# Patient Record
Sex: FEMALE | Race: ASIAN | NOT HISPANIC OR LATINO | URBAN - METROPOLITAN AREA
[De-identification: names, ages, dates, MRNs, and addresses within clinical notes are randomized per-mention and may not be internally consistent; named-entity substitution may affect disease eponyms.]

---

## 2018-06-12 ENCOUNTER — EMERGENCY (EMERGENCY)
Facility: HOSPITAL | Age: 48
LOS: 1 days | Discharge: HOME | End: 2018-06-12
Admitting: EMERGENCY MEDICINE
Payer: COMMERCIAL

## 2018-06-12 VITALS
HEART RATE: 70 BPM | TEMPERATURE: 98 F | SYSTOLIC BLOOD PRESSURE: 172 MMHG | DIASTOLIC BLOOD PRESSURE: 98 MMHG | RESPIRATION RATE: 18 BRPM | OXYGEN SATURATION: 98 %

## 2018-06-12 PROCEDURE — 73610 X-RAY EXAM OF ANKLE: CPT | Mod: 26,LT

## 2018-06-12 PROCEDURE — 29515 APPLICATION SHORT LEG SPLINT: CPT | Mod: LT

## 2018-06-12 PROCEDURE — 99283 EMERGENCY DEPT VISIT LOW MDM: CPT | Mod: 25

## 2018-06-12 RX ORDER — IBUPROFEN 200 MG
600 TABLET ORAL ONCE
Qty: 0 | Refills: 0 | Status: COMPLETED | OUTPATIENT
Start: 2018-06-12 | End: 2018-06-12

## 2018-06-12 RX ADMIN — Medication 600 MILLIGRAM(S): at 12:46

## 2018-06-12 NOTE — ED PROVIDER NOTE - MUSCULOSKELETAL MINIMAL EXAM
L lateral ankle with flexion. No malleolar tenderness, no achilles tenderness.  No swelling, + 2 pedal pulse.  ROM intact.

## 2018-06-12 NOTE — ED PROVIDER NOTE - MEDICAL DECISION MAKING DETAILS
47 y/o F presents to ED c/o L ankle pain s/p rolling ankle. 47 y/o F presents to ED c/o L ankle pain s/p rolling ankle.  Wet read xray negative.  Pt placed in stirrup brace.  Weight bearing as tolerated.  Likely ankle sprain.  Pt referred to ortho for f/u for persistent pain.
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
## 2018-06-16 DIAGNOSIS — Z91.018 ALLERGY TO OTHER FOODS: ICD-10-CM
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
## 2018-06-16 DIAGNOSIS — S93.402A SPRAIN OF UNSPECIFIED LIGAMENT OF LEFT ANKLE, INITIAL ENCOUNTER: ICD-10-CM

## 2018-06-16 DIAGNOSIS — M25.572 PAIN IN LEFT ANKLE AND JOINTS OF LEFT FOOT: ICD-10-CM
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
## 2018-06-16 DIAGNOSIS — I10 ESSENTIAL (PRIMARY) HYPERTENSION: ICD-10-CM

## 2019-04-17 ENCOUNTER — OUTPATIENT (OUTPATIENT)
Dept: OUTPATIENT SERVICES | Facility: HOSPITAL | Age: 49
LOS: 1 days | End: 2019-04-17
Payer: COMMERCIAL

## 2019-04-17 PROBLEM — I10 ESSENTIAL (PRIMARY) HYPERTENSION: Chronic | Status: ACTIVE | Noted: 2018-06-12

## 2019-04-18 PROCEDURE — 78014 THYROID IMAGING W/BLOOD FLOW: CPT | Mod: 26

## 2019-04-18 PROCEDURE — A9516: CPT

## 2019-04-18 PROCEDURE — 78014 THYROID IMAGING W/BLOOD FLOW: CPT

## 2022-01-21 VITALS
HEIGHT: 64 IN | RESPIRATION RATE: 16 BRPM | WEIGHT: 151.02 LBS | HEART RATE: 75 BPM | SYSTOLIC BLOOD PRESSURE: 125 MMHG | OXYGEN SATURATION: 98 % | DIASTOLIC BLOOD PRESSURE: 82 MMHG | TEMPERATURE: 98 F

## 2022-01-21 NOTE — ASU PATIENT PROFILE, ADULT - FALL HARM RISK - UNIVERSAL INTERVENTIONS
Bed in lowest position, wheels locked, appropriate side rails in place/Call bell, personal items and telephone in reach/Instruct patient to call for assistance before getting out of bed or chair/Non-slip footwear when patient is out of bed/[place] to call system/Physically safe environment - no spills, clutter or unnecessary equipment/Purposeful Proactive Rounding/Room/bathroom lighting operational, light cord in reach

## 2022-01-24 ENCOUNTER — OUTPATIENT (OUTPATIENT)
Dept: INPATIENT UNIT | Facility: HOSPITAL | Age: 52
LOS: 1 days | End: 2022-01-24
Payer: COMMERCIAL

## 2022-01-24 VITALS
SYSTOLIC BLOOD PRESSURE: 120 MMHG | HEART RATE: 81 BPM | OXYGEN SATURATION: 96 % | RESPIRATION RATE: 17 BRPM | DIASTOLIC BLOOD PRESSURE: 68 MMHG

## 2022-01-24 LAB
BLD GP AB SCN SERPL QL: NEGATIVE — SIGNIFICANT CHANGE UP
RH IG SCN BLD-IMP: POSITIVE — SIGNIFICANT CHANGE UP

## 2022-01-24 PROCEDURE — 88112 CYTOPATH CELL ENHANCE TECH: CPT

## 2022-01-24 PROCEDURE — 88305 TISSUE EXAM BY PATHOLOGIST: CPT | Mod: 26

## 2022-01-24 PROCEDURE — 88112 CYTOPATH CELL ENHANCE TECH: CPT | Mod: 26

## 2022-01-24 PROCEDURE — 86850 RBC ANTIBODY SCREEN: CPT

## 2022-01-24 PROCEDURE — 88307 TISSUE EXAM BY PATHOLOGIST: CPT | Mod: 26

## 2022-01-24 PROCEDURE — 58661 LAPAROSCOPY REMOVE ADNEXA: CPT

## 2022-01-24 PROCEDURE — 88305 TISSUE EXAM BY PATHOLOGIST: CPT

## 2022-01-24 PROCEDURE — 88307 TISSUE EXAM BY PATHOLOGIST: CPT

## 2022-01-24 PROCEDURE — C1889: CPT

## 2022-01-24 PROCEDURE — 86901 BLOOD TYPING SEROLOGIC RH(D): CPT

## 2022-01-24 PROCEDURE — 86900 BLOOD TYPING SEROLOGIC ABO: CPT

## 2022-01-24 DEVICE — SURGIFLO HEMOSTATIC MATRIX KIT
Type: IMPLANTABLE DEVICE | Status: NON-FUNCTIONAL
Removed: 2022-01-24

## 2022-01-24 RX ORDER — SODIUM CHLORIDE 9 MG/ML
1000 INJECTION, SOLUTION INTRAVENOUS
Refills: 0 | Status: DISCONTINUED | OUTPATIENT
Start: 2022-01-24 | End: 2022-01-24

## 2022-01-24 RX ORDER — AMLODIPINE BESYLATE 2.5 MG/1
1 TABLET ORAL
Qty: 0 | Refills: 0 | DISCHARGE

## 2022-01-24 RX ORDER — LOSARTAN POTASSIUM 100 MG/1
1 TABLET, FILM COATED ORAL
Qty: 0 | Refills: 0 | DISCHARGE

## 2022-01-24 RX ORDER — HYDROMORPHONE HYDROCHLORIDE 2 MG/ML
0.5 INJECTION INTRAMUSCULAR; INTRAVENOUS; SUBCUTANEOUS ONCE
Refills: 0 | Status: DISCONTINUED | OUTPATIENT
Start: 2022-01-24 | End: 2022-01-24

## 2022-01-24 RX ORDER — LEVOTHYROXINE SODIUM 125 MCG
0 TABLET ORAL
Qty: 0 | Refills: 0 | DISCHARGE

## 2022-01-24 RX ORDER — LEVOTHYROXINE SODIUM 125 MCG
1 TABLET ORAL
Qty: 0 | Refills: 0 | DISCHARGE

## 2022-01-24 RX ORDER — ONDANSETRON 8 MG/1
8 TABLET, FILM COATED ORAL ONCE
Refills: 0 | Status: DISCONTINUED | OUTPATIENT
Start: 2022-01-24 | End: 2022-01-24

## 2022-01-24 RX ADMIN — SODIUM CHLORIDE 125 MILLILITER(S): 9 INJECTION, SOLUTION INTRAVENOUS at 15:04

## 2022-01-24 NOTE — BRIEF OPERATIVE NOTE - OPERATION/FINDINGS
Exam under anesthesia with normal anteverted uterus, no pelvic masses. On laparoscopic entry right sided hydrosalpinx noted with adhesions to ovary and to right pelvic sidewall. Similarly, adhesions from left tube and ovary to left pelvic sidewall. Pelvic washings taken. L/s BSO performed with lysis of adhesions, specimens placed in endocatch bag and removed through LLQ 10mm trocar, sent to pathology. LLQ port closed under direct visualization. Skin closed with 3-0 monocryl.

## 2022-01-24 NOTE — BRIEF OPERATIVE NOTE - NSICDXBRIEFPROCEDURE_GEN_ALL_CORE_FT
PROCEDURES:  Laparoscopic bilateral salpingo-oophorectomy 24-Jan-2022 13:53:10  Comfort Dorsey  Exam under anesthesia, pelvic 24-Jan-2022 13:53:20  Comfort Dorsey

## 2022-01-24 NOTE — ASU DISCHARGE PLAN (ADULT/PEDIATRIC) - NS MD DC FALL RISK RISK
For information on Fall & Injury Prevention, visit: [email or web address] OR  [email or web address] OR  https://www.cdc.gov/steadi/patient.html

## 2022-01-26 LAB — NON-GYNECOLOGICAL CYTOLOGY STUDY: SIGNIFICANT CHANGE UP

## 2022-01-28 LAB — SURGICAL PATHOLOGY STUDY: SIGNIFICANT CHANGE UP

## 2023-01-07 ENCOUNTER — INPATIENT (INPATIENT)
Facility: HOSPITAL | Age: 53
LOS: 1 days | Discharge: HOME | DRG: 419 | End: 2023-01-09
Attending: SURGERY | Admitting: SURGERY
Payer: COMMERCIAL

## 2023-01-07 VITALS
HEART RATE: 61 BPM | OXYGEN SATURATION: 99 % | RESPIRATION RATE: 16 BRPM | SYSTOLIC BLOOD PRESSURE: 113 MMHG | TEMPERATURE: 98 F | DIASTOLIC BLOOD PRESSURE: 78 MMHG

## 2023-01-07 DIAGNOSIS — Z90.722 ACQUIRED ABSENCE OF OVARIES, BILATERAL: Chronic | ICD-10-CM

## 2023-01-07 PROBLEM — E03.9 HYPOTHYROIDISM, UNSPECIFIED: Chronic | Status: ACTIVE | Noted: 2022-01-21

## 2023-01-07 PROBLEM — Z15.01 GENETIC SUSCEPTIBILITY TO MALIGNANT NEOPLASM OF BREAST: Chronic | Status: ACTIVE | Noted: 2022-01-21

## 2023-01-07 PROBLEM — R06.83 SNORING: Chronic | Status: ACTIVE | Noted: 2022-01-21

## 2023-01-07 LAB
ALBUMIN SERPL ELPH-MCNC: 4.5 G/DL — SIGNIFICANT CHANGE UP (ref 3.4–5)
ALBUMIN SERPL ELPH-MCNC: 4.6 G/DL — SIGNIFICANT CHANGE UP (ref 3.3–5)
ALP SERPL-CCNC: 90 U/L — SIGNIFICANT CHANGE UP (ref 40–120)
ALP SERPL-CCNC: 99 U/L — SIGNIFICANT CHANGE UP (ref 40–120)
ALT FLD-CCNC: 38 U/L — SIGNIFICANT CHANGE UP (ref 10–45)
ALT FLD-CCNC: 59 U/L — HIGH (ref 12–42)
ANION GAP SERPL CALC-SCNC: 10 MMOL/L — SIGNIFICANT CHANGE UP (ref 5–17)
ANION GAP SERPL CALC-SCNC: 11 MMOL/L — SIGNIFICANT CHANGE UP (ref 9–16)
APPEARANCE UR: CLEAR — SIGNIFICANT CHANGE UP
AST SERPL-CCNC: 16 U/L — SIGNIFICANT CHANGE UP (ref 10–40)
AST SERPL-CCNC: 20 U/L — SIGNIFICANT CHANGE UP (ref 15–37)
BACTERIA # UR AUTO: PRESENT /HPF
BASOPHILS # BLD AUTO: 0.02 K/UL — SIGNIFICANT CHANGE UP (ref 0–0.2)
BASOPHILS NFR BLD AUTO: 0.4 % — SIGNIFICANT CHANGE UP (ref 0–2)
BILIRUB SERPL-MCNC: 0.3 MG/DL — SIGNIFICANT CHANGE UP (ref 0.2–1.2)
BILIRUB SERPL-MCNC: 0.7 MG/DL — SIGNIFICANT CHANGE UP (ref 0.2–1.2)
BILIRUB UR-MCNC: NEGATIVE — SIGNIFICANT CHANGE UP
BLD GP AB SCN SERPL QL: NEGATIVE — SIGNIFICANT CHANGE UP
BUN SERPL-MCNC: 14 MG/DL — SIGNIFICANT CHANGE UP (ref 7–23)
BUN SERPL-MCNC: 21 MG/DL — SIGNIFICANT CHANGE UP (ref 7–23)
CALCIUM SERPL-MCNC: 9.2 MG/DL — SIGNIFICANT CHANGE UP (ref 8.4–10.5)
CALCIUM SERPL-MCNC: 9.4 MG/DL — SIGNIFICANT CHANGE UP (ref 8.5–10.5)
CHLORIDE SERPL-SCNC: 104 MMOL/L — SIGNIFICANT CHANGE UP (ref 96–108)
CHLORIDE SERPL-SCNC: 104 MMOL/L — SIGNIFICANT CHANGE UP (ref 96–108)
CO2 SERPL-SCNC: 28 MMOL/L — SIGNIFICANT CHANGE UP (ref 22–31)
CO2 SERPL-SCNC: 29 MMOL/L — SIGNIFICANT CHANGE UP (ref 22–31)
COLOR SPEC: YELLOW — SIGNIFICANT CHANGE UP
CREAT SERPL-MCNC: 0.68 MG/DL — SIGNIFICANT CHANGE UP (ref 0.5–1.3)
CREAT SERPL-MCNC: 0.9 MG/DL — SIGNIFICANT CHANGE UP (ref 0.5–1.3)
DIFF PNL FLD: ABNORMAL
EGFR: 104 ML/MIN/1.73M2 — SIGNIFICANT CHANGE UP
EGFR: 76 ML/MIN/1.73M2 — SIGNIFICANT CHANGE UP
EOSINOPHIL # BLD AUTO: 0.06 K/UL — SIGNIFICANT CHANGE UP (ref 0–0.5)
EOSINOPHIL NFR BLD AUTO: 1.1 % — SIGNIFICANT CHANGE UP (ref 0–6)
EPI CELLS # UR: SIGNIFICANT CHANGE UP /HPF (ref 0–5)
FLUAV AG NPH QL: SIGNIFICANT CHANGE UP
FLUBV AG NPH QL: SIGNIFICANT CHANGE UP
GLUCOSE SERPL-MCNC: 157 MG/DL — HIGH (ref 70–99)
GLUCOSE SERPL-MCNC: 96 MG/DL — SIGNIFICANT CHANGE UP (ref 70–99)
GLUCOSE UR QL: NEGATIVE — SIGNIFICANT CHANGE UP
HCG SERPL-ACNC: 1 MIU/ML — SIGNIFICANT CHANGE UP
HCG SERPL-ACNC: 1 MIU/ML — SIGNIFICANT CHANGE UP
HCT VFR BLD CALC: 43.1 % — SIGNIFICANT CHANGE UP (ref 34.5–45)
HCT VFR BLD CALC: 44.8 % — SIGNIFICANT CHANGE UP (ref 34.5–45)
HGB BLD-MCNC: 14.4 G/DL — SIGNIFICANT CHANGE UP (ref 11.5–15.5)
HGB BLD-MCNC: 14.8 G/DL — SIGNIFICANT CHANGE UP (ref 11.5–15.5)
IMM GRANULOCYTES NFR BLD AUTO: 0.2 % — SIGNIFICANT CHANGE UP (ref 0–0.9)
KETONES UR-MCNC: NEGATIVE — SIGNIFICANT CHANGE UP
LEUKOCYTE ESTERASE UR-ACNC: NEGATIVE — SIGNIFICANT CHANGE UP
LIDOCAIN IGE QN: 149 U/L — SIGNIFICANT CHANGE UP (ref 73–393)
LYMPHOCYTES # BLD AUTO: 1 K/UL — SIGNIFICANT CHANGE UP (ref 1–3.3)
LYMPHOCYTES # BLD AUTO: 17.7 % — SIGNIFICANT CHANGE UP (ref 13–44)
MAGNESIUM SERPL-MCNC: 2 MG/DL — SIGNIFICANT CHANGE UP (ref 1.6–2.6)
MCHC RBC-ENTMCNC: 29.1 PG — SIGNIFICANT CHANGE UP (ref 27–34)
MCHC RBC-ENTMCNC: 29.1 PG — SIGNIFICANT CHANGE UP (ref 27–34)
MCHC RBC-ENTMCNC: 33 GM/DL — SIGNIFICANT CHANGE UP (ref 32–36)
MCHC RBC-ENTMCNC: 33.4 GM/DL — SIGNIFICANT CHANGE UP (ref 32–36)
MCV RBC AUTO: 87.2 FL — SIGNIFICANT CHANGE UP (ref 80–100)
MCV RBC AUTO: 88.2 FL — SIGNIFICANT CHANGE UP (ref 80–100)
MONOCYTES # BLD AUTO: 0.3 K/UL — SIGNIFICANT CHANGE UP (ref 0–0.9)
MONOCYTES NFR BLD AUTO: 5.3 % — SIGNIFICANT CHANGE UP (ref 2–14)
NEUTROPHILS # BLD AUTO: 4.26 K/UL — SIGNIFICANT CHANGE UP (ref 1.8–7.4)
NEUTROPHILS NFR BLD AUTO: 75.3 % — SIGNIFICANT CHANGE UP (ref 43–77)
NITRITE UR-MCNC: NEGATIVE — SIGNIFICANT CHANGE UP
NRBC # BLD: 0 /100 WBCS — SIGNIFICANT CHANGE UP (ref 0–0)
NRBC # BLD: 0 /100 WBCS — SIGNIFICANT CHANGE UP (ref 0–0)
PH UR: 7 — SIGNIFICANT CHANGE UP (ref 5–8)
PHOSPHATE SERPL-MCNC: 3.7 MG/DL — SIGNIFICANT CHANGE UP (ref 2.5–4.5)
PLATELET # BLD AUTO: 243 K/UL — SIGNIFICANT CHANGE UP (ref 150–400)
PLATELET # BLD AUTO: 262 K/UL — SIGNIFICANT CHANGE UP (ref 150–400)
POTASSIUM SERPL-MCNC: 3.1 MMOL/L — LOW (ref 3.5–5.3)
POTASSIUM SERPL-MCNC: 3.3 MMOL/L — LOW (ref 3.5–5.3)
POTASSIUM SERPL-SCNC: 3.1 MMOL/L — LOW (ref 3.5–5.3)
POTASSIUM SERPL-SCNC: 3.3 MMOL/L — LOW (ref 3.5–5.3)
PROT SERPL-MCNC: 7.3 G/DL — SIGNIFICANT CHANGE UP (ref 6–8.3)
PROT SERPL-MCNC: 8.2 G/DL — SIGNIFICANT CHANGE UP (ref 6.4–8.2)
PROT UR-MCNC: NEGATIVE MG/DL — SIGNIFICANT CHANGE UP
RBC # BLD: 4.94 M/UL — SIGNIFICANT CHANGE UP (ref 3.8–5.2)
RBC # BLD: 5.08 M/UL — SIGNIFICANT CHANGE UP (ref 3.8–5.2)
RBC # FLD: 12.4 % — SIGNIFICANT CHANGE UP (ref 10.3–14.5)
RBC # FLD: 12.5 % — SIGNIFICANT CHANGE UP (ref 10.3–14.5)
RBC CASTS # UR COMP ASSIST: < 5 /HPF — SIGNIFICANT CHANGE UP
RH IG SCN BLD-IMP: POSITIVE — SIGNIFICANT CHANGE UP
RSV RNA NPH QL NAA+NON-PROBE: SIGNIFICANT CHANGE UP
SARS-COV-2 RNA SPEC QL NAA+PROBE: SIGNIFICANT CHANGE UP
SODIUM SERPL-SCNC: 142 MMOL/L — SIGNIFICANT CHANGE UP (ref 135–145)
SODIUM SERPL-SCNC: 144 MMOL/L — SIGNIFICANT CHANGE UP (ref 132–145)
SP GR SPEC: 1.02 — SIGNIFICANT CHANGE UP (ref 1–1.03)
UROBILINOGEN FLD QL: 0.2 E.U./DL — SIGNIFICANT CHANGE UP
WBC # BLD: 5.65 K/UL — SIGNIFICANT CHANGE UP (ref 3.8–10.5)
WBC # BLD: 7.53 K/UL — SIGNIFICANT CHANGE UP (ref 3.8–10.5)
WBC # FLD AUTO: 5.65 K/UL — SIGNIFICANT CHANGE UP (ref 3.8–10.5)
WBC # FLD AUTO: 7.53 K/UL — SIGNIFICANT CHANGE UP (ref 3.8–10.5)
WBC UR QL: < 5 /HPF — SIGNIFICANT CHANGE UP

## 2023-01-07 PROCEDURE — 76705 ECHO EXAM OF ABDOMEN: CPT | Mod: 26

## 2023-01-07 PROCEDURE — 74177 CT ABD & PELVIS W/CONTRAST: CPT | Mod: 26

## 2023-01-07 PROCEDURE — 99053 MED SERV 10PM-8AM 24 HR FAC: CPT

## 2023-01-07 PROCEDURE — 99285 EMERGENCY DEPT VISIT HI MDM: CPT

## 2023-01-07 RX ORDER — LEVOTHYROXINE SODIUM 125 MCG
60 TABLET ORAL AT BEDTIME
Refills: 0 | Status: DISCONTINUED | OUTPATIENT
Start: 2023-01-07 | End: 2023-01-08

## 2023-01-07 RX ORDER — SODIUM CHLORIDE 9 MG/ML
1000 INJECTION, SOLUTION INTRAVENOUS
Refills: 0 | Status: DISCONTINUED | OUTPATIENT
Start: 2023-01-07 | End: 2023-01-08

## 2023-01-07 RX ORDER — FAMOTIDINE 10 MG/ML
20 INJECTION INTRAVENOUS ONCE
Refills: 0 | Status: COMPLETED | OUTPATIENT
Start: 2023-01-07 | End: 2023-01-07

## 2023-01-07 RX ORDER — KETOROLAC TROMETHAMINE 30 MG/ML
15 SYRINGE (ML) INJECTION ONCE
Refills: 0 | Status: DISCONTINUED | OUTPATIENT
Start: 2023-01-07 | End: 2023-01-07

## 2023-01-07 RX ORDER — METRONIDAZOLE 500 MG
500 TABLET ORAL ONCE
Refills: 0 | Status: COMPLETED | OUTPATIENT
Start: 2023-01-07 | End: 2023-01-07

## 2023-01-07 RX ORDER — POTASSIUM CHLORIDE 20 MEQ
10 PACKET (EA) ORAL
Refills: 0 | Status: COMPLETED | OUTPATIENT
Start: 2023-01-07 | End: 2023-01-08

## 2023-01-07 RX ORDER — SODIUM CHLORIDE 9 MG/ML
1000 INJECTION INTRAMUSCULAR; INTRAVENOUS; SUBCUTANEOUS ONCE
Refills: 0 | Status: COMPLETED | OUTPATIENT
Start: 2023-01-07 | End: 2023-01-07

## 2023-01-07 RX ORDER — CEFTRIAXONE 500 MG/1
1000 INJECTION, POWDER, FOR SOLUTION INTRAMUSCULAR; INTRAVENOUS ONCE
Refills: 0 | Status: COMPLETED | OUTPATIENT
Start: 2023-01-07 | End: 2023-01-07

## 2023-01-07 RX ORDER — ONDANSETRON 8 MG/1
4 TABLET, FILM COATED ORAL ONCE
Refills: 0 | Status: COMPLETED | OUTPATIENT
Start: 2023-01-07 | End: 2023-01-07

## 2023-01-07 RX ORDER — HEPARIN SODIUM 5000 [USP'U]/ML
5000 INJECTION INTRAVENOUS; SUBCUTANEOUS EVERY 8 HOURS
Refills: 0 | Status: DISCONTINUED | OUTPATIENT
Start: 2023-01-07 | End: 2023-01-09

## 2023-01-07 RX ORDER — AMLODIPINE BESYLATE 2.5 MG/1
5 TABLET ORAL EVERY 24 HOURS
Refills: 0 | Status: DISCONTINUED | OUTPATIENT
Start: 2023-01-07 | End: 2023-01-09

## 2023-01-07 RX ADMIN — Medication 15 MILLIGRAM(S): at 07:31

## 2023-01-07 RX ADMIN — Medication 100 MILLIGRAM(S): at 16:40

## 2023-01-07 RX ADMIN — FAMOTIDINE 20 MILLIGRAM(S): 10 INJECTION INTRAVENOUS at 07:31

## 2023-01-07 RX ADMIN — AMLODIPINE BESYLATE 5 MILLIGRAM(S): 2.5 TABLET ORAL at 23:51

## 2023-01-07 RX ADMIN — SODIUM CHLORIDE 1000 MILLILITER(S): 9 INJECTION INTRAMUSCULAR; INTRAVENOUS; SUBCUTANEOUS at 07:31

## 2023-01-07 RX ADMIN — Medication 100 MILLIEQUIVALENT(S): at 23:51

## 2023-01-07 RX ADMIN — Medication 15 MILLIGRAM(S): at 17:08

## 2023-01-07 RX ADMIN — SODIUM CHLORIDE 1000 MILLILITER(S): 9 INJECTION INTRAMUSCULAR; INTRAVENOUS; SUBCUTANEOUS at 17:08

## 2023-01-07 RX ADMIN — CEFTRIAXONE 1000 MILLIGRAM(S): 500 INJECTION, POWDER, FOR SOLUTION INTRAMUSCULAR; INTRAVENOUS at 17:08

## 2023-01-07 RX ADMIN — CEFTRIAXONE 100 MILLIGRAM(S): 500 INJECTION, POWDER, FOR SOLUTION INTRAMUSCULAR; INTRAVENOUS at 16:09

## 2023-01-07 RX ADMIN — ONDANSETRON 4 MILLIGRAM(S): 8 TABLET, FILM COATED ORAL at 07:31

## 2023-01-07 RX ADMIN — HEPARIN SODIUM 5000 UNIT(S): 5000 INJECTION INTRAVENOUS; SUBCUTANEOUS at 22:23

## 2023-01-07 RX ADMIN — SODIUM CHLORIDE 105 MILLILITER(S): 9 INJECTION, SOLUTION INTRAVENOUS at 22:21

## 2023-01-07 NOTE — H&P ADULT - ASSESSMENT
52 BRCA positive with PMHX of HLD, HTN, pre-diabetes, hypothyroidism and PSHx of risk-reducing BSO (2022, Dr. Foster at [facility]) presents with 1-day history of upper back pain associated with nausea and 1 episode of NBNB emesis. Afebrile, non-tachycardic, normotensive. On exam, abdomen is soft, non-distended, mild RUQ ttp with negative Reyes's sign.WBC 5.65, H/H 14.8/44.8, chemistry unremarkable, LFTs wnl. CTAP shows stone at gallbladder neck with mild distension of the gallbladder without wall thickening or PCF, CBD 0.8cm, no intrahepatic biliary dilatation. RUQ US consistent with acute calculus cholecystitis.     Admit to general surgery team 4, regional, Dr. May  NPO/IVF  Pain and nausea control PRN  Home meds as appropriate  HSQ/SCDs/OOBA/IS  AM labs  Add on to OR for laparoscopic cholecystectomy with IOC 52 BRCA positive with PMHX of HLD, HTN, pre-diabetes, hypothyroidism and PSHx of risk-reducing BSO (2022, Dr. Foster at [facility]) presents with 1-day history of upper back pain associated with nausea and 1 episode of NBNB emesis. Afebrile, non-tachycardic, normotensive. On exam, abdomen is soft, non-distended, mild RUQ ttp with negative Reyes's sign.WBC 5.65, H/H 14.8/44.8, chemistry unremarkable, LFTs wnl. CTAP shows stone at gallbladder neck with mild distension of the gallbladder without wall thickening or PCF, CBD 0.8cm, no intrahepatic biliary dilatation. Symptomatic cholelithiasis.    Admit to general surgery team 4, regional, Dr. May  NPO/IVF  Pain and nausea control PRN  Home meds as appropriate  HSQ/SCDs/OOBA/IS  AM labs  Add on to OR for laparoscopic cholecystectomy with IOC    CHIEF RESIDENT ADDENDUM  Agree with above. 52F PMHx BRCA + s/p BSO 2022 a/w epigastric pain, nausea and vomiting. VSS, abdomen soft, nontender, nondistended, negative Reyes's sign. WBC 6. T bili and LFTs normal. CT and US with equivocal acute cholecystitis with gallbladder wall thickening, pericholecystic fluid, negative sonographic reyes's sign but a 2.7cm impacted stone in the neck with CBD 0.7cm. 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 Less likely acute cholecystitis given no current RUQ pain or tenderness, no WBC. Plan for lap francia with IOC tomorrow. Discussed with attending surgeon.

## 2023-01-07 NOTE — ED ADULT NURSE REASSESSMENT NOTE - NS ED NURSE REASSESS COMMENT FT1
Pt received from Andrey LINDA. Pt resting comfortably in bed. No s/s of acute distress. Will continue to monitor

## 2023-01-07 NOTE — PATIENT PROFILE ADULT - FALL HARM RISK - UNIVERSAL INTERVENTIONS
Bed in lowest position, wheels locked, appropriate side rails in place/Call bell, personal items and telephone in reach/Instruct patient to call for assistance before getting out of bed or chair/Non-slip footwear when patient is out of bed/[place] to call system/Physically safe environment - no spills, clutter or unnecessary equipment/Purposeful Proactive Rounding/Room/bathroom lighting operational, light cord in reach

## 2023-01-07 NOTE — ED PROVIDER NOTE - OBJECTIVE STATEMENT
52-year-old female with hx HTN hypothyroid on synthroid, BRCA-2 positive, incidental gallstones found on MRI (done for cancer screening), comes to ED with 1 day of gradual onset epigastric and right upper quadrant pain radiating to the back, 1-2 episodes of vomiting today, denies fever/chills, no trauma.

## 2023-01-07 NOTE — ED PROVIDER NOTE - PROGRESS NOTE DETAILS
Sign out from Dr. Hurd: pt w hx of gallstones w RUQ pain, nausea, vomiting, found to have RUQ TTP.  Pending CT a/p and US RUQ.    CT a/p shows stone in neck of gallbladder.  US consistent with acute calculus cholecystitis.  On my eval, pt reports persistent RUQ pain which had improved transiently earlier today but is now worsening.  +TTP in RUQ.  Neg murphys.  Will give ceftriaxone/ flagyl, admit to surgery at .

## 2023-01-07 NOTE — ED ADULT NURSE NOTE - CHIEF COMPLAINT QUOTE
Pt walk into ED c/o mid back pain, nausea, increased bleching and bloating x2 hours. Pt denies injuries, abdominal pain, or urinary changes. Pt reports normal bowel movements. Pt endorses receiving routine MRI x2 months ago and being told she had a gallstone. Pt reports PMH of HTN, and hypothyroidism. Pt states "I'm flying to [place] tomorrow so I wanted to get it checked out before I left".

## 2023-01-07 NOTE — ED ADULT NURSE NOTE - OBJECTIVE STATEMENT
Pt presents to ed reporting abd pain, back pain x 2 hrs PTA, associated with nausea. denies fever, denies dysuria  hist of gallstones

## 2023-01-07 NOTE — ED PROVIDER NOTE - CLINICAL SUMMARY MEDICAL DECISION MAKING FREE TEXT BOX
Right upper quadrant pain with history of gallstones, rule out cholecystitis.  Will check labs, ultrasound and CT.  Reevaluate.  Signed out to a.m. team pending imaging and final dispo.

## 2023-01-07 NOTE — ED ADULT TRIAGE NOTE - CHIEF COMPLAINT QUOTE
Pt walk into ED c/o mid back pain, nausea, increased bleching and bloating x2 hours. Pt denies injuries, abdominal pain, or urinary changes. Pt reports normal bowel movements. Pt endorses receiving routine MRI x2 months ago and being told she had a gallstone. Pt reports PMH of HTN, and hypothyroidism. Pt states "I'm flying to [place] tomorrow so I wanted to get it checked out before I left".

## 2023-01-07 NOTE — ED PROVIDER NOTE - PHYSICAL EXAMINATION
VSS in NAD non toxic appearing  NCAT EOMI PERRL OP clear  heart RRR no murmur   lungs CTA no wheezing no rales no rhonchi   abd soft (+) RUQ tenderness (+) mild epigastric tenderness ND no CVAT no guarding no rebound   normal neuro exam CN I-XII grossly intact, no groos motor or sensory deficits  no peripheral c/c/e

## 2023-01-07 NOTE — H&P ADULT - HISTORY OF PRESENT ILLNESS
52 BRCA positive with PMHX of HLD, HTN, pre-diabetes, hypothyroidism and PSHx of risk-reducing BSO (2022, Dr. Foster at [facility]) presents with 1-day history of upper back pain associated with nausea and 1 episode of NBNB emesis. She states the pain woke her up from sleep last night and was progressively worsening with increasing nausea. She denies fevers/chill, constipation/diarrhea, hematochezia/melena. Passing flatus, last BM yesterday AM. She has biannual screening MRIs which she reports have shown GB stones.     Family history of breast cancer in mother, denies history of colon cancer, Crohn's disease, ulcerative colitis.    Last colonoscopy ___, last EGD ___.    On arrival, she is afebrile, non-tachycardic, normotensive. Labs show WBC 5.65, H/H 14.8/44.8, chemistry unremarkable, LFTs wnl. CTAP shows stone at gallbladder neck with mild distension of the gallbladder without wall thickening or PCF, CBD 0.8cm, no intrahepatic biliary dilatation. RUQ US consistent with acute calculus cholecystitis.      52 BRCA positive with PMHX of HLD, HTN, pre-diabetes, hypothyroidism and PSHx of risk-reducing BSO (2022, Dr. Foster at [facility]) presents with 1-day history of upper back pain associated with nausea and 1 episode of NBNB emesis. She states the pain woke her up from sleep last night and was progressively worsening with increasing nausea. She denies fevers/chill, constipation/diarrhea, hematochezia/melena. Passing flatus, last BM yesterday AM. She has biannual screening MRIs which she reports have shown GB stones.     Family history of breast cancer in mother, denies history of colon cancer, Crohn's disease, ulcerative colitis.    Last colonoscopy in 2020 reportedly normal, no history of EGD.    On arrival, she is afebrile, non-tachycardic, normotensive. Labs show WBC 5.65, H/H 14.8/44.8, chemistry unremarkable, LFTs wnl. CTAP shows stone at gallbladder neck with mild distension of the gallbladder without wall thickening or PCF, CBD 0.8cm, no intrahepatic biliary dilatation. RUQ US consistent with acute calculus cholecystitis.

## 2023-01-08 LAB
A1C WITH ESTIMATED AVERAGE GLUCOSE RESULT: 5.8 % — HIGH (ref 4–5.6)
ALBUMIN SERPL ELPH-MCNC: 4 G/DL — SIGNIFICANT CHANGE UP (ref 3.3–5)
ALP SERPL-CCNC: 80 U/L — SIGNIFICANT CHANGE UP (ref 40–120)
ALT FLD-CCNC: 31 U/L — SIGNIFICANT CHANGE UP (ref 10–45)
ANION GAP SERPL CALC-SCNC: 11 MMOL/L — SIGNIFICANT CHANGE UP (ref 5–17)
AST SERPL-CCNC: 16 U/L — SIGNIFICANT CHANGE UP (ref 10–40)
BILIRUB SERPL-MCNC: 0.7 MG/DL — SIGNIFICANT CHANGE UP (ref 0.2–1.2)
BLD GP AB SCN SERPL QL: NEGATIVE — SIGNIFICANT CHANGE UP
BUN SERPL-MCNC: 14 MG/DL — SIGNIFICANT CHANGE UP (ref 7–23)
CALCIUM SERPL-MCNC: 8.7 MG/DL — SIGNIFICANT CHANGE UP (ref 8.4–10.5)
CHLORIDE SERPL-SCNC: 105 MMOL/L — SIGNIFICANT CHANGE UP (ref 96–108)
CO2 SERPL-SCNC: 27 MMOL/L — SIGNIFICANT CHANGE UP (ref 22–31)
CREAT SERPL-MCNC: 0.75 MG/DL — SIGNIFICANT CHANGE UP (ref 0.5–1.3)
CULTURE RESULTS: SIGNIFICANT CHANGE UP
EGFR: 95 ML/MIN/1.73M2 — SIGNIFICANT CHANGE UP
ESTIMATED AVERAGE GLUCOSE: 120 MG/DL — HIGH (ref 68–114)
GLUCOSE SERPL-MCNC: 101 MG/DL — HIGH (ref 70–99)
HCT VFR BLD CALC: 40.3 % — SIGNIFICANT CHANGE UP (ref 34.5–45)
HGB BLD-MCNC: 13.1 G/DL — SIGNIFICANT CHANGE UP (ref 11.5–15.5)
MAGNESIUM SERPL-MCNC: 2.1 MG/DL — SIGNIFICANT CHANGE UP (ref 1.6–2.6)
MCHC RBC-ENTMCNC: 29 PG — SIGNIFICANT CHANGE UP (ref 27–34)
MCHC RBC-ENTMCNC: 32.5 GM/DL — SIGNIFICANT CHANGE UP (ref 32–36)
MCV RBC AUTO: 89.2 FL — SIGNIFICANT CHANGE UP (ref 80–100)
NRBC # BLD: 0 /100 WBCS — SIGNIFICANT CHANGE UP (ref 0–0)
PHOSPHATE SERPL-MCNC: 3.8 MG/DL — SIGNIFICANT CHANGE UP (ref 2.5–4.5)
PLATELET # BLD AUTO: 228 K/UL — SIGNIFICANT CHANGE UP (ref 150–400)
POTASSIUM SERPL-MCNC: 3.8 MMOL/L — SIGNIFICANT CHANGE UP (ref 3.5–5.3)
POTASSIUM SERPL-SCNC: 3.8 MMOL/L — SIGNIFICANT CHANGE UP (ref 3.5–5.3)
PROT SERPL-MCNC: 6.3 G/DL — SIGNIFICANT CHANGE UP (ref 6–8.3)
RBC # BLD: 4.52 M/UL — SIGNIFICANT CHANGE UP (ref 3.8–5.2)
RBC # FLD: 12.6 % — SIGNIFICANT CHANGE UP (ref 10.3–14.5)
RH IG SCN BLD-IMP: POSITIVE — SIGNIFICANT CHANGE UP
SODIUM SERPL-SCNC: 143 MMOL/L — SIGNIFICANT CHANGE UP (ref 135–145)
SPECIMEN SOURCE: SIGNIFICANT CHANGE UP
WBC # BLD: 5.12 K/UL — SIGNIFICANT CHANGE UP (ref 3.8–10.5)
WBC # FLD AUTO: 5.12 K/UL — SIGNIFICANT CHANGE UP (ref 3.8–10.5)

## 2023-01-08 PROCEDURE — 99253 IP/OBS CNSLTJ NEW/EST LOW 45: CPT

## 2023-01-08 PROCEDURE — 88304 TISSUE EXAM BY PATHOLOGIST: CPT | Mod: 26

## 2023-01-08 DEVICE — LIGATING CLIPS WECK HEMOLOK POLYMER MEDIUM-LARGE (GREEN) 6: Type: IMPLANTABLE DEVICE | Status: FUNCTIONAL

## 2023-01-08 RX ORDER — LEVOTHYROXINE SODIUM 125 MCG
75 TABLET ORAL
Refills: 0 | Status: DISCONTINUED | OUTPATIENT
Start: 2023-01-08 | End: 2023-01-09

## 2023-01-08 RX ORDER — HYDROMORPHONE HYDROCHLORIDE 2 MG/ML
0.5 INJECTION INTRAMUSCULAR; INTRAVENOUS; SUBCUTANEOUS ONCE
Refills: 0 | Status: DISCONTINUED | OUTPATIENT
Start: 2023-01-08 | End: 2023-01-09

## 2023-01-08 RX ORDER — METRONIDAZOLE 500 MG
500 TABLET ORAL EVERY 8 HOURS
Refills: 0 | Status: DISCONTINUED | OUTPATIENT
Start: 2023-01-08 | End: 2023-01-09

## 2023-01-08 RX ORDER — SODIUM CHLORIDE 9 MG/ML
1000 INJECTION, SOLUTION INTRAVENOUS
Refills: 0 | Status: DISCONTINUED | OUTPATIENT
Start: 2023-01-08 | End: 2023-01-09

## 2023-01-08 RX ORDER — HYDROMORPHONE HYDROCHLORIDE 2 MG/ML
0.25 INJECTION INTRAMUSCULAR; INTRAVENOUS; SUBCUTANEOUS ONCE
Refills: 0 | Status: DISCONTINUED | OUTPATIENT
Start: 2023-01-08 | End: 2023-01-08

## 2023-01-08 RX ORDER — CEFTRIAXONE 500 MG/1
2000 INJECTION, POWDER, FOR SOLUTION INTRAMUSCULAR; INTRAVENOUS ONCE
Refills: 0 | Status: COMPLETED | OUTPATIENT
Start: 2023-01-08 | End: 2023-01-08

## 2023-01-08 RX ORDER — POTASSIUM CHLORIDE 20 MEQ
10 PACKET (EA) ORAL
Refills: 0 | Status: COMPLETED | OUTPATIENT
Start: 2023-01-08 | End: 2023-01-08

## 2023-01-08 RX ORDER — OXYCODONE HYDROCHLORIDE 5 MG/1
5 TABLET ORAL ONCE
Refills: 0 | Status: DISCONTINUED | OUTPATIENT
Start: 2023-01-08 | End: 2023-01-09

## 2023-01-08 RX ORDER — ONDANSETRON 8 MG/1
4 TABLET, FILM COATED ORAL ONCE
Refills: 0 | Status: DISCONTINUED | OUTPATIENT
Start: 2023-01-08 | End: 2023-01-09

## 2023-01-08 RX ADMIN — HEPARIN SODIUM 5000 UNIT(S): 5000 INJECTION INTRAVENOUS; SUBCUTANEOUS at 05:36

## 2023-01-08 RX ADMIN — Medication 100 MILLIEQUIVALENT(S): at 01:57

## 2023-01-08 RX ADMIN — Medication 100 MILLIEQUIVALENT(S): at 09:59

## 2023-01-08 RX ADMIN — Medication 100 MILLIGRAM(S): at 22:02

## 2023-01-08 RX ADMIN — HEPARIN SODIUM 5000 UNIT(S): 5000 INJECTION INTRAVENOUS; SUBCUTANEOUS at 22:02

## 2023-01-08 RX ADMIN — Medication 100 MILLIEQUIVALENT(S): at 02:54

## 2023-01-08 RX ADMIN — Medication 100 MILLIEQUIVALENT(S): at 11:34

## 2023-01-08 RX ADMIN — AMLODIPINE BESYLATE 5 MILLIGRAM(S): 2.5 TABLET ORAL at 22:02

## 2023-01-08 RX ADMIN — Medication 60 MICROGRAM(S): at 00:50

## 2023-01-08 RX ADMIN — CEFTRIAXONE 100 MILLIGRAM(S): 500 INJECTION, POWDER, FOR SOLUTION INTRAMUSCULAR; INTRAVENOUS at 18:38

## 2023-01-08 RX ADMIN — Medication 100 MILLIEQUIVALENT(S): at 00:51

## 2023-01-08 RX ADMIN — HEPARIN SODIUM 5000 UNIT(S): 5000 INJECTION INTRAVENOUS; SUBCUTANEOUS at 13:32

## 2023-01-08 NOTE — PRE-ANESTHESIA EVALUATION ADULT - NSANTHPMHFT_GEN_ALL_CORE
Per primary team: This is a 52 year old female BRCA positive with PMHX of HLD, HTN, pre-diabetes, hypothyroidism and PSHx of risk-reducing BSO (2022, Dr. Foster at [facility]) who presents with upper back pain, nausea, and emesis. Admitted with plan for laparoscopic cholecystectomy. Per primary team: This is a 52 year old female BRCA positive with PMHX of HLD, HTN, pre-diabetes, hypothyroidism and PSHx of risk-reducing BSO (2022, Dr. Foster at [facility]) who presents with upper back pain, nausea, and emesis. Admitted with plan for laparoscopic cholecystectomy.    Otherwise, patient denies CP/SOB. METS > 4. No prior issues with anesthesia.

## 2023-01-08 NOTE — CONSULT NOTE ADULT - SUBJECTIVE AND OBJECTIVE BOX
CHRISTIANO FRANCIS  52y  Female      Patient is a 52y old  Female who presents with a chief complaint of biliary colic (08 Jan 2023 08:46)        PAST MEDICAL/SURGICAL HISTORY  PAST MEDICAL & SURGICAL HISTORY:  HTN (hypertension)      Snoring      Hypothyroid      BRCA2 positive      History of bilateral salpingo-oophorectomy (BSO)          REVIEW OF SYSTEMS:  CONSTITUTIONAL: No fever, weight loss, or fatigue  EYES: No eye pain, visual disturbances, or discharge  ENMT:  No difficulty hearing, tinnitus, vertigo; No sinus or throat pain  NECK: No pain or stiffness  BREASTS: No pain, masses, or nipple discharge  RESPIRATORY: No cough, wheezing, chills or hemoptysis; No shortness of breath  CARDIOVASCULAR: No chest pain, palpitations, dizziness, or leg swelling  GASTROINTESTINAL: No abdominal or epigastric pain. No nausea, vomiting, or hematemesis; No diarrhea or constipation. No melena or hematochezia.  GENITOURINARY: No dysuria, frequency, hematuria, or incontinence  NEUROLOGICAL: No headaches, memory loss, loss of strength, numbness, or tremors  SKIN: No itching, burning, rashes, or lesions   LYMPH NODES: No enlarged glands  ENDOCRINE: No heat or cold intolerance; No hair loss  MUSCULOSKELETAL: No joint pain or swelling; No muscle, back, or extremity pain  PSYCHIATRIC: No depression, anxiety, mood swings, or difficulty sleeping  HEME/LYMPH: No easy bruising, or bleeding gums  ALLERY AND IMMUNOLOGIC: No hives or eczema    T(C): 36.7 (01-08-23 @ 08:32), Max: 37.1 (01-07-23 @ 20:50)  HR: 76 (01-08-23 @ 08:32) (72 - 81)  BP: 133/72 (01-08-23 @ 08:32) (117/72 - 162/82)  RR: 17 (01-08-23 @ 08:32) (16 - 18)  SpO2: 96% (01-08-23 @ 08:32) (95% - 98%)  Wt(kg): --Vital Signs Last 24 Hrs  T(C): 36.7 (08 Jan 2023 08:32), Max: 37.1 (07 Jan 2023 20:50)  T(F): 98.1 (08 Jan 2023 08:32), Max: 98.7 (07 Jan 2023 20:50)  HR: 76 (08 Jan 2023 08:32) (72 - 81)  BP: 133/72 (08 Jan 2023 08:32) (117/72 - 162/82)  BP(mean): --  RR: 17 (08 Jan 2023 08:32) (16 - 18)  SpO2: 96% (08 Jan 2023 08:32) (95% - 98%)    Parameters below as of 08 Jan 2023 08:32  Patient On (Oxygen Delivery Method): room air        PHYSICAL EXAM:  GENERAL: NAD, well-groomed, well-developed  HEAD:  Atraumatic, Normocephalic  EYES: EOMI, PERRLA, conjunctiva and sclera clear  ENMT: No tonsillar erythema, exudates, or enlargement; Moist mucous membranes, Good dentition, No lesions  NECK: Supple, No JVD, Normal thyroid  NERVOUS SYSTEM:  Alert & Oriented X3, Good concentration; Motor Strength 5/5 B/L upper and lower extremities; DTRs 2+ intact and symmetric  CHEST/LUNG: Clear to percussion bilaterally; No rales, rhonchi, wheezing, or rubs  HEART: Regular rate and rhythm; No murmurs, rubs, or gallops  ABDOMEN: Soft, Nontender, Nondistended; Bowel sounds present  EXTREMITIES:  2+ Peripheral Pulses, No clubbing, cyanosis, or edema  LYMPH: No lymphadenopathy noted  SKIN: No rashes or lesions    Consultant(s) Notes Reviewed:  [x ] YES  [ ] NO  Care Discussed with Consultants/Other Providers [ x] YES  [ ] NO    LABS:  CBC   01-08-23 @ 06:45  Hematcorit 40.3  Hemoglobin 13.1  Mean Cell Hemoglobin 29.0  Platelet Count-Automated 228  RBC Count 4.52  Red Cell Distrib Width 12.6  Wbc Count 5.12  01-07-23 @ 20:00  Hematcorit 43.1  Hemoglobin 14.4  Mean Cell Hemoglobin 29.1  Platelet Count-Automated 262  RBC Count 4.94  Red Cell Distrib Width 12.4  Wbc Count 7.53      BMP  01-08-23 @ 06:45  Anion Gap. Serum 11  Blood Urea Nitrogen,Serm 14  Calcium, Total Serum 8.7  Carbon Dioxide, Serum 27  Chloride, Serum 105  Creatinine, Serum 0.75  eGFR in  --  eGFR in Non Afican American --  Gloucose, serum 101  Potassium, Serum 3.8  Sodium, Serum 143              01-07-23 @ 20:00  Anion Gap. Serum 10  Blood Urea Nitrogen,Serm 14  Calcium, Total Serum 9.2  Carbon Dioxide, Serum 28  Chloride, Serum 104  Creatinine, Serum 0.68  eGFR in  --  eGFR in Non Afican American --  Gloucose, serum 96  Potassium, Serum 3.1  Sodium, Serum 142              01-07-23 @ 07:18  Anion Gap. Serum 11  Blood Urea Nitrogen,Serm 21  Calcium, Total Serum 9.4  Carbon Dioxide, Serum 29  Chloride, Serum 104  Creatinine, Serum 0.90  eGFR in  --  eGFR in Non Afican American --  Gloucose, serum 157  Potassium, Serum 3.3  Sodium, Serum 144                  CMP  01-08-23 @ 06:45  Tracie Aminotransferase(ALT/SGPT)31  Albumin, Serum 4.0  Alkaline Phosphatase, Serum 80  Anion Gap, Serum 11  Aspartate Aminotransferase (AST/SGOT)16  Bilirubin Total, Serum 0.7  Blood Urea Nitrogen, Serum 14  Calcium,Total Serum 8.7  Carbon Dioxide, Serum 27  Chloride, Serum 105  Creatinine, Serum 0.75  eGFR if  --  eGFR if Non African American --  Glucose, Serum 101  Potassium, Serum 3.8  Protein Total, Serum 6.3  Sodium, Serum 143                      01-07-23 @ 20:00  Tracie Aminotransferase(ALT/SGPT)38  Albumin, Serum 4.6  Alkaline Phosphatase, Serum 90  Anion Gap, Serum 10  Aspartate Aminotransferase (AST/SGOT)16  Bilirubin Total, Serum 0.7  Blood Urea Nitrogen, Serum 14  Calcium,Total Serum 9.2  Carbon Dioxide, Serum 28  Chloride, Serum 104  Creatinine, Serum 0.68  eGFR if  --  eGFR if Non African American --  Glucose, Serum 96  Potassium, Serum 3.1  Protein Total, Serum 7.3  Sodium, Serum 142                      01-07-23 @ 07:18  Tracie Aminotransferase(ALT/SGPT)59  Albumin, Serum 4.5  Alkaline Phosphatase, Serum 99  Anion Gap, Serum 11  Aspartate Aminotransferase (AST/SGOT)20  Bilirubin Total, Serum 0.3  Blood Urea Nitrogen, Serum 21  Calcium,Total Serum 9.4  Carbon Dioxide, Serum 29  Chloride, Serum 104  Creatinine, Serum 0.90  eGFR if  --  eGFR if Non African American --  Glucose, Serum 157  Potassium, Serum 3.3  Protein Total, Serum 8.2  Sodium, Serum 144                          PT/INR      Amylase/Lipase  01-07-23 @ 07:18  Amylase, Serum Total --  Lipase, Serum 149            RADIOLOGY & ADDITIONAL TESTS:    Imaging Personally Reviewed:  [ ] YES  [ ] NO CHRISTIANO FRANCIS  52y  Female      Patient is a 52y old  Female who presents with a chief complaint of biliary colic (08 Jan 2023 08:46)        PAST MEDICAL/SURGICAL HISTORY  PAST MEDICAL & SURGICAL HISTORY:  HTN (hypertension)      Snoring      Hypothyroid      BRCA2 positive      History of bilateral salpingo-oophorectomy (BSO)          REVIEW OF SYSTEMS:  CONSTITUTIONAL: No fever, weight loss, or fatigue  EYES: No eye pain, visual disturbances, or discharge  ENMT:  No difficulty hearing, tinnitus, vertigo; No sinus or throat pain  NECK: No pain or stiffness  RESPIRATORY: No cough, wheezing, chills or hemoptysis; No shortness of breath  CARDIOVASCULAR: No chest pain, palpitations, dizziness, or leg swelling  GASTROINTESTINAL: + abdominal or epigastric pain. No nausea, vomiting, or hematemesis; No diarrhea or constipation. No melena or hematochezia.  GENITOURINARY: No dysuria, frequency, hematuria, or incontinence  NEUROLOGICAL: No headaches, memory loss, loss of strength, numbness, or tremors  SKIN: No itching, burning, rashes, or lesions   LYMPH NODES: No enlarged glands  ENDOCRINE: No heat or cold intolerance; No hair loss  MUSCULOSKELETAL: No joint pain or swelling; No muscle, back, or extremity pain  PSYCHIATRIC: No depression, anxiety, mood swings, or difficulty sleeping  HEME/LYMPH: No easy bruising, or bleeding gums  ALLERY AND IMMUNOLOGIC: No hives or eczema    T(C): 36.7 (01-08-23 @ 08:32), Max: 37.1 (01-07-23 @ 20:50)  HR: 76 (01-08-23 @ 08:32) (72 - 81)  BP: 133/72 (01-08-23 @ 08:32) (117/72 - 162/82)  RR: 17 (01-08-23 @ 08:32) (16 - 18)  SpO2: 96% (01-08-23 @ 08:32) (95% - 98%)  Wt(kg): --Vital Signs Last 24 Hrs  T(C): 36.7 (08 Jan 2023 08:32), Max: 37.1 (07 Jan 2023 20:50)  T(F): 98.1 (08 Jan 2023 08:32), Max: 98.7 (07 Jan 2023 20:50)  HR: 76 (08 Jan 2023 08:32) (72 - 81)  BP: 133/72 (08 Jan 2023 08:32) (117/72 - 162/82)  BP(mean): --  RR: 17 (08 Jan 2023 08:32) (16 - 18)  SpO2: 96% (08 Jan 2023 08:32) (95% - 98%)    Parameters below as of 08 Jan 2023 08:32  Patient On (Oxygen Delivery Method): room air        PHYSICAL EXAM:  GENERAL: NAD, well-groomed, well-developed  HEAD:  Atraumatic, Normocephalic  EYES: EOMI, PERRLA, conjunctiva and sclera clear  ENMT: No tonsillar erythema, exudates, or enlargement; Moist mucous membranes  NECK: Supple, No JVD, Normal thyroid  NERVOUS SYSTEM:  Alert & Oriented X3, Good concentration; Moving all extremities   CHEST/LUNG:  No rales, rhonchi, wheezing, or rubs  HEART: Regular rate and rhythm; No murmurs, rubs, or gallops  ABDOMEN: Soft, Nontender, Nondistended; Bowel sounds present  EXTREMITIES:  2+ Peripheral Pulses, No clubbing, cyanosis, or edema  LYMPH: No lymphadenopathy noted  SKIN: No rashes or lesions    Consultant(s) Notes Reviewed:  [x ] YES  [ ] NO  Care Discussed with Consultants/Other Providers [ x] YES  [ ] NO    LABS:  CBC   01-08-23 @ 06:45  Hematcorit 40.3  Hemoglobin 13.1  Mean Cell Hemoglobin 29.0  Platelet Count-Automated 228  RBC Count 4.52  Red Cell Distrib Width 12.6  Wbc Count 5.12  01-07-23 @ 20:00  Hematcorit 43.1  Hemoglobin 14.4  Mean Cell Hemoglobin 29.1  Platelet Count-Automated 262  RBC Count 4.94  Red Cell Distrib Width 12.4  Wbc Count 7.53      BMP  01-08-23 @ 06:45  Anion Gap. Serum 11  Blood Urea Nitrogen,Serm 14  Calcium, Total Serum 8.7  Carbon Dioxide, Serum 27  Chloride, Serum 105  Creatinine, Serum 0.75  eGFR in  --  eGFR in Non Afican American --  Gloucose, serum 101  Potassium, Serum 3.8  Sodium, Serum 143              01-07-23 @ 20:00  Anion Gap. Serum 10  Blood Urea Nitrogen,Serm 14  Calcium, Total Serum 9.2  Carbon Dioxide, Serum 28  Chloride, Serum 104  Creatinine, Serum 0.68  eGFR in  --  eGFR in Non Afican American --  Gloucose, serum 96  Potassium, Serum 3.1  Sodium, Serum 142              01-07-23 @ 07:18  Anion Gap. Serum 11  Blood Urea Nitrogen,Serm 21  Calcium, Total Serum 9.4  Carbon Dioxide, Serum 29  Chloride, Serum 104  Creatinine, Serum 0.90  eGFR in  --  eGFR in Non Afican American --  Gloucose, serum 157  Potassium, Serum 3.3  Sodium, Serum 144                  CMP  01-08-23 @ 06:45  Tracie Aminotransferase(ALT/SGPT)31  Albumin, Serum 4.0  Alkaline Phosphatase, Serum 80  Anion Gap, Serum 11  Aspartate Aminotransferase (AST/SGOT)16  Bilirubin Total, Serum 0.7  Blood Urea Nitrogen, Serum 14  Calcium,Total Serum 8.7  Carbon Dioxide, Serum 27  Chloride, Serum 105  Creatinine, Serum 0.75  eGFR if  --  eGFR if Non African American --  Glucose, Serum 101  Potassium, Serum 3.8  Protein Total, Serum 6.3  Sodium, Serum 143                      01-07-23 @ 20:00  Tracie Aminotransferase(ALT/SGPT)38  Albumin, Serum 4.6  Alkaline Phosphatase, Serum 90  Anion Gap, Serum 10  Aspartate Aminotransferase (AST/SGOT)16  Bilirubin Total, Serum 0.7  Blood Urea Nitrogen, Serum 14  Calcium,Total Serum 9.2  Carbon Dioxide, Serum 28  Chloride, Serum 104  Creatinine, Serum 0.68  eGFR if  --  eGFR if Non African American --  Glucose, Serum 96  Potassium, Serum 3.1  Protein Total, Serum 7.3  Sodium, Serum 142                      01-07-23 @ 07:18  Tracie Aminotransferase(ALT/SGPT)59  Albumin, Serum 4.5  Alkaline Phosphatase, Serum 99  Anion Gap, Serum 11  Aspartate Aminotransferase (AST/SGOT)20  Bilirubin Total, Serum 0.3  Blood Urea Nitrogen, Serum 21  Calcium,Total Serum 9.4  Carbon Dioxide, Serum 29  Chloride, Serum 104  Creatinine, Serum 0.90  eGFR if  --  eGFR if Non African American --  Glucose, Serum 157  Potassium, Serum 3.3  Protein Total, Serum 8.2  Sodium, Serum 144                          PT/INR      Amylase/Lipase  01-07-23 @ 07:18  Amylase, Serum Total --  Lipase, Serum 149            RADIOLOGY & ADDITIONAL TESTS:    < from: US Abdomen Upper Quadrant Right (01.07.23 @ 15:24) >  FINDINGS:  Liver: Increased echogenicity. Hypoechoic lesion in right hepatic lobe   3.4 x 2.4 x 2.7 cm.  Bile ducts: No intrahepatic duct dilatation. Mildly dilated common bile   duct measures 7 mm.  Gallbladder: Fluid distended. Nonmobile 2.7 cm gallstone at level of   neck. Negative sonographic Reyes's sign. Wall thickening up to 0.5 cm.   Small pericholecystic fluid.  Pancreas: Visualized portions are within normal limits.  Right kidney: 10.8 cm.No hydronephrosis.  Ascites: None.  IVC: Visualized portions are within normal limits.    IMPRESSION:  Acute calculus cholecystitis.  Mildly dilated common bile duct.  Hepatic steatosis.  Indeterminate right hepatic lobe lesion. Recommend contrast-enhanced   liver MRI.    < end of copied text >    < from: CT Abdomen and Pelvis w/ IV Cont (01.07.23 @ 10:19) >    FINDINGS:  LOWER CHEST: Linear atelectasis in the lingula.    LIVER: Hepatic steatosis. 2.9 x 2.6 cm enhancing lesion in hepatic   segment 8. Additional subcentimeter hypodensity in segment 7 is too small   to characterize  BILE DUCTS: The common bile duct is dilated measuring up to 0.8 cm on   craniocaudal images.  GALLBLADDER: Cholelithiasis with a 2 cm gallstone at the gallbladder   neck. The gallbladder is mildly distended measuring up to 4.1 cm in axial   diameter. No significant wall thickening orpericholecystic fluid.  SPLEEN: Within normal limits.  PANCREAS: Within normal limits.  ADRENALS: Within normal limits.  KIDNEYS/URETERS: Subcentimeter hypodensity in the midpole right kidney,   too small to characterize, likely a small cyst. No renal stones or   hydronephrosis.    BLADDER: Within normal limits.  REPRODUCTIVE ORGANS: Retroverted uterus. Uterus and adnexa within normal   limits.    BOWEL: A few colonic diverticula. No bowel obstruction. Appendix is   normal.  PERITONEUM: No ascites.  VESSELS: Within normal limits.  RETROPERITONEUM/LYMPH NODES: No lymphadenopathy.  ABDOMINAL WALL: Within normal limits.  BONES: Degenerative changes.    IMPRESSION:  1.  Gallstone at the gallbladder neck with mild distention of the   gallbladder. No significant gallbladder wall thickening or   pericholecystic fluid. Cannot exclude early acute cholecystitis.  2.  Mildly dilated common bile duct measuring 0.8 cm. No intrahepatic   biliary dilatation. Recommend correlation with bilirubin level.  3.  2.9 cm homogeneously enhancing liver lesion, possibly an FNH.   Recommend follow-up MRI.      < end of copied text >      Imaging Personally Reviewed:  [ ] YES  [ ] NO

## 2023-01-08 NOTE — PROGRESS NOTE ADULT - ASSESSMENT
52F BRCA positive with PMHX of HLD, HTN, pre-diabetes, hypothyroidism and PSHx of risk-reducing BSO (2022, Dr. Foster at [facility]) presents with 1-day history of upper back pain associated with nausea and 1 episode of NBNB emesis. CTAP shows stone at gallbladder neck with mild distension of the gallbladder without wall thickening or PCF, CBD 0.8cm, no intrahepatic biliary dilatation. Symptomatic cholelithiasis. s/p OR for laparoscopic cholecystectomy with IOC.     abx 24h postop  CLD  Pain and nausea control PRN  Home meds as appropriate  HSQ/SCDs/OOBA/IS  AM labs   52F BRCA positive with PMHX of HLD, HTN, pre-diabetes, hypothyroidism and PSHx of risk-reducing BSO (2022, Dr. Foster at [facility]) presents with 1-day history of upper back pain associated with nausea and 1 episode of NBNB emesis. CTAP shows stone at gallbladder neck with mild distension of the gallbladder without wall thickening or PCF, CBD 0.8cm, no intrahepatic biliary dilatation. Symptomatic cholelithiasis. s/p OR for laparoscopic cholecystectomy with IOC.     abx 24h postop  CLD  Pain and nausea control PRN  Home meds as appropriate  HSQ/SCDs/OOBA/IS

## 2023-01-08 NOTE — BRIEF OPERATIVE NOTE - OPERATION/FINDINGS
Pt placed in reverse Trendelenburg w/ right side up. Omental attachments to GB were gently swept away. GB fundus retracted superiorly over dome of liver. Filmy adhesions between the GB & omentum/duo cauterized. GB infundibulum retracted laterally towards RLQ exposing Calot’s triangle. Critical view of safety obtained. Cystic duct and artery clipped and divided. ICG confirmed. Peritoneal attachments btw GB & liver bed  w/ electrocautery. Hemostasis achieved. No leakage of bile from cystic duct stump.

## 2023-01-08 NOTE — CONSULT NOTE ADULT - ASSESSMENT
52F BRCA positive with PMHX of HLD, HTN, pre-diabetes, hypothyroidism and PSHx of risk-reducing BSO (2022, Dr. Foster at [facility]) presents with 1-day history of upper back pain associated with nausea and 1 episode of NBNB emesis. CTAP shows stone at gallbladder neck with mild distension of the gallbladder without wall thickening or PCF, CBD 0.8cm, no intrahepatic biliary dilatation. Symptomatic cholelithiasis. Will be going to the OR for laparoscopic cholecystectomy with IOC.     #Symptomatic cholelithiasis  - Plan for OR today for laparoscopic cholecystectomy with IOC. Pain control and management per surgery.    #HTN  - On losartan 100mg daily and amlodipine 5mg daily at home. Can hold losartan for now to prevent perioperative hypotension. Monitor BP, if BP becomes high, can resume after surgery with parameter.     #hypothyroidism  - On levothyroxine 75mcg (Mon - Fri), 50mcg (on Sat, Sun). (Dose adjusted by her PCP to maintain thyroid function stable)  - Continue home dosage. If give in IV form, 75% of usual dosage. (56mcg IV daily).      #HLD  - Not on meds. DASH diet    #Pre-diabetes  - Pt was diagnosed previously. Low carb diet. Outpatient follow up.

## 2023-01-08 NOTE — PROGRESS NOTE ADULT - ASSESSMENT
52F BRCA positive with PMHX of HLD, HTN, pre-diabetes, hypothyroidism and PSHx of risk-reducing BSO (2022, Dr. Foster at [facility]) presents with 1-day history of upper back pain associated with nausea and 1 episode of NBNB emesis. CTAP shows stone at gallbladder neck with mild distension of the gallbladder without wall thickening or PCF, CBD 0.8cm, no intrahepatic biliary dilatation. Symptomatic cholelithiasis. Will be going to the OR for laparoscopic cholecystectomy with IOC.     OR today  NPO/IVF  Pain and nausea control PRN  Home meds as appropriate  HSQ/SCDs/OOBA/IS  AM labs

## 2023-01-09 VITALS
DIASTOLIC BLOOD PRESSURE: 72 MMHG | RESPIRATION RATE: 17 BRPM | TEMPERATURE: 99 F | SYSTOLIC BLOOD PRESSURE: 126 MMHG | OXYGEN SATURATION: 96 % | HEART RATE: 70 BPM

## 2023-01-09 LAB
ALBUMIN SERPL ELPH-MCNC: 3.8 G/DL — SIGNIFICANT CHANGE UP (ref 3.3–5)
ALP SERPL-CCNC: 83 U/L — SIGNIFICANT CHANGE UP (ref 40–120)
ALT FLD-CCNC: 62 U/L — HIGH (ref 10–45)
ANION GAP SERPL CALC-SCNC: 10 MMOL/L — SIGNIFICANT CHANGE UP (ref 5–17)
AST SERPL-CCNC: 46 U/L — HIGH (ref 10–40)
BILIRUB DIRECT SERPL-MCNC: <0.2 MG/DL — SIGNIFICANT CHANGE UP (ref 0–0.3)
BILIRUB INDIRECT FLD-MCNC: SIGNIFICANT CHANGE UP MG/DL (ref 0.2–1)
BILIRUB SERPL-MCNC: 0.4 MG/DL — SIGNIFICANT CHANGE UP (ref 0.2–1.2)
BUN SERPL-MCNC: 13 MG/DL — SIGNIFICANT CHANGE UP (ref 7–23)
CALCIUM SERPL-MCNC: 8.6 MG/DL — SIGNIFICANT CHANGE UP (ref 8.4–10.5)
CHLORIDE SERPL-SCNC: 102 MMOL/L — SIGNIFICANT CHANGE UP (ref 96–108)
CO2 SERPL-SCNC: 27 MMOL/L — SIGNIFICANT CHANGE UP (ref 22–31)
CREAT SERPL-MCNC: 0.64 MG/DL — SIGNIFICANT CHANGE UP (ref 0.5–1.3)
EGFR: 106 ML/MIN/1.73M2 — SIGNIFICANT CHANGE UP
GLUCOSE SERPL-MCNC: 122 MG/DL — HIGH (ref 70–99)
GRAM STN FLD: SIGNIFICANT CHANGE UP
HCT VFR BLD CALC: 38.6 % — SIGNIFICANT CHANGE UP (ref 34.5–45)
HGB BLD-MCNC: 13 G/DL — SIGNIFICANT CHANGE UP (ref 11.5–15.5)
MAGNESIUM SERPL-MCNC: 1.9 MG/DL — SIGNIFICANT CHANGE UP (ref 1.6–2.6)
MCHC RBC-ENTMCNC: 29.5 PG — SIGNIFICANT CHANGE UP (ref 27–34)
MCHC RBC-ENTMCNC: 33.7 GM/DL — SIGNIFICANT CHANGE UP (ref 32–36)
MCV RBC AUTO: 87.7 FL — SIGNIFICANT CHANGE UP (ref 80–100)
NRBC # BLD: 0 /100 WBCS — SIGNIFICANT CHANGE UP (ref 0–0)
PHOSPHATE SERPL-MCNC: 4.4 MG/DL — SIGNIFICANT CHANGE UP (ref 2.5–4.5)
PLATELET # BLD AUTO: 233 K/UL — SIGNIFICANT CHANGE UP (ref 150–400)
POTASSIUM SERPL-MCNC: 3.7 MMOL/L — SIGNIFICANT CHANGE UP (ref 3.5–5.3)
POTASSIUM SERPL-SCNC: 3.7 MMOL/L — SIGNIFICANT CHANGE UP (ref 3.5–5.3)
PROT SERPL-MCNC: 6.4 G/DL — SIGNIFICANT CHANGE UP (ref 6–8.3)
RBC # BLD: 4.4 M/UL — SIGNIFICANT CHANGE UP (ref 3.8–5.2)
RBC # FLD: 12 % — SIGNIFICANT CHANGE UP (ref 10.3–14.5)
SODIUM SERPL-SCNC: 139 MMOL/L — SIGNIFICANT CHANGE UP (ref 135–145)
SPECIMEN SOURCE: SIGNIFICANT CHANGE UP
WBC # BLD: 5.25 K/UL — SIGNIFICANT CHANGE UP (ref 3.8–10.5)
WBC # FLD AUTO: 5.25 K/UL — SIGNIFICANT CHANGE UP (ref 3.8–10.5)

## 2023-01-09 PROCEDURE — 99285 EMERGENCY DEPT VISIT HI MDM: CPT | Mod: 25

## 2023-01-09 PROCEDURE — 83735 ASSAY OF MAGNESIUM: CPT

## 2023-01-09 PROCEDURE — 85027 COMPLETE CBC AUTOMATED: CPT

## 2023-01-09 PROCEDURE — 99232 SBSQ HOSP IP/OBS MODERATE 35: CPT

## 2023-01-09 PROCEDURE — 96375 TX/PRO/DX INJ NEW DRUG ADDON: CPT

## 2023-01-09 PROCEDURE — 80048 BASIC METABOLIC PNL TOTAL CA: CPT

## 2023-01-09 PROCEDURE — 74177 CT ABD & PELVIS W/CONTRAST: CPT

## 2023-01-09 PROCEDURE — 86850 RBC ANTIBODY SCREEN: CPT

## 2023-01-09 PROCEDURE — 86901 BLOOD TYPING SEROLOGIC RH(D): CPT

## 2023-01-09 PROCEDURE — 83036 HEMOGLOBIN GLYCOSYLATED A1C: CPT

## 2023-01-09 PROCEDURE — 87086 URINE CULTURE/COLONY COUNT: CPT

## 2023-01-09 PROCEDURE — 96374 THER/PROPH/DIAG INJ IV PUSH: CPT

## 2023-01-09 PROCEDURE — 87205 SMEAR GRAM STAIN: CPT

## 2023-01-09 PROCEDURE — 84100 ASSAY OF PHOSPHORUS: CPT

## 2023-01-09 PROCEDURE — 76705 ECHO EXAM OF ABDOMEN: CPT

## 2023-01-09 PROCEDURE — C1889: CPT

## 2023-01-09 PROCEDURE — 83690 ASSAY OF LIPASE: CPT

## 2023-01-09 PROCEDURE — 87637 SARSCOV2&INF A&B&RSV AMP PRB: CPT

## 2023-01-09 PROCEDURE — 80053 COMPREHEN METABOLIC PANEL: CPT

## 2023-01-09 PROCEDURE — 84702 CHORIONIC GONADOTROPIN TEST: CPT

## 2023-01-09 PROCEDURE — 80076 HEPATIC FUNCTION PANEL: CPT

## 2023-01-09 PROCEDURE — 88304 TISSUE EXAM BY PATHOLOGIST: CPT

## 2023-01-09 PROCEDURE — 85025 COMPLETE CBC W/AUTO DIFF WBC: CPT

## 2023-01-09 PROCEDURE — 87070 CULTURE OTHR SPECIMN AEROBIC: CPT

## 2023-01-09 PROCEDURE — 81001 URINALYSIS AUTO W/SCOPE: CPT

## 2023-01-09 PROCEDURE — 36415 COLL VENOUS BLD VENIPUNCTURE: CPT

## 2023-01-09 PROCEDURE — 86900 BLOOD TYPING SEROLOGIC ABO: CPT

## 2023-01-09 PROCEDURE — 87075 CULTR BACTERIA EXCEPT BLOOD: CPT

## 2023-01-09 RX ORDER — DOCUSATE SODIUM 100 MG
1 CAPSULE ORAL
Qty: 30 | Refills: 0
Start: 2023-01-09 | End: 2023-02-07

## 2023-01-09 RX ORDER — ACETAMINOPHEN 500 MG
2 TABLET ORAL
Qty: 56 | Refills: 0
Start: 2023-01-09 | End: 2023-01-15

## 2023-01-09 RX ORDER — OXYCODONE HYDROCHLORIDE 5 MG/1
1 TABLET ORAL
Qty: 10 | Refills: 0
Start: 2023-01-09 | End: 2023-01-09

## 2023-01-09 RX ORDER — OXYCODONE HYDROCHLORIDE 5 MG/1
1 TABLET ORAL
Qty: 10 | Refills: 0
Start: 2023-01-09

## 2023-01-09 RX ORDER — MAGNESIUM SULFATE 500 MG/ML
1 VIAL (ML) INJECTION ONCE
Refills: 0 | Status: DISCONTINUED | OUTPATIENT
Start: 2023-01-09 | End: 2023-01-09

## 2023-01-09 RX ORDER — POTASSIUM CHLORIDE 20 MEQ
40 PACKET (EA) ORAL ONCE
Refills: 0 | Status: DISCONTINUED | OUTPATIENT
Start: 2023-01-09 | End: 2023-01-09

## 2023-01-09 RX ADMIN — Medication 75 MICROGRAM(S): at 05:05

## 2023-01-09 RX ADMIN — Medication 100 MILLIGRAM(S): at 05:05

## 2023-01-09 RX ADMIN — HEPARIN SODIUM 5000 UNIT(S): 5000 INJECTION INTRAVENOUS; SUBCUTANEOUS at 05:05

## 2023-01-09 NOTE — DISCHARGE NOTE PROVIDER - HOSPITAL COURSE
52F BRCA positive with PMHX of HLD, HTN, pre-diabetes, hypothyroidism and PSHx of risk-reducing BSO (2022, Dr. Foster at [facility]) presented with 1-day history of upper back pain associated with nausea and 1 episode of NBNB emesis. She stated the pain woke her up from sleep the night before and was progressively worsening with increasing nausea. She denied fevers/chill, constipation/diarrhea, hematochezia/melena. Passed flatus, last BM yesterday AM. She had biannual screening MRIs which she reported have shown GB stones. Family history of breast cancer in mother, denies history of colon cancer, Crohn's disease, ulcerative colitis. Last colonoscopy in 2020 reportedly normal, no history of EGD. On arrival, she was afebrile, non-tachycardic, normotensive. Labs showed WBC 5.65, H/H 14.8/44.8, chemistry unremarkable, LFTs wnl. CTAP showed stone at gallbladder neck with mild distension of the gallbladder without wall thickening or PCF, CBD 0.8cm, no intrahepatic biliary dilatation. RUQ US consistent with acute calculus cholecystitis. Patient was admitted and taken for a laparoscopic cholecystectomy and monitored post operatively Her postoperative course was unremarkable with advancement of diet, passing trial of void, and pain control. On day of discharge patient was stable to be d/c'd home.

## 2023-01-09 NOTE — DISCHARGE NOTE PROVIDER - NSDCFUADDINST_GEN_ALL_CORE_FT
General Discharge Instructions:  Please resume all regular home medications unless specifically advised not to take a particular medication. Also, please take any new medications as prescribed.  Please get plenty of rest, continue to ambulate several times per day, and drink adequate amounts of fluids. Avoid lifting weights greater than 5-10 lbs until you follow-up with your surgeon, who will instruct you further regarding activity restrictions.  Avoid driving or operating heavy machinery while taking pain medications.  Please follow-up with your surgeon and Primary Care Provider (PCP) as advised.  Incision Care:  *Please call your doctor or nurse practitioner if you have increased pain, swelling, redness, or drainage from the incision site.  *Avoid swimming and baths until your follow-up appointment.  *You may shower, and wash surgical incisions with a mild soap and warm water. Gently pat the area dry.  *If you have staples, they will be removed at your follow-up appointment.  *If you have steri-strips, they will fall off on their own. Please remove any remaining strips 7-10 days after surgery.  Warning Signs:  Please call your doctor or nurse practitioner if you experience the following:  *You experience new chest pain, pressure, squeezing or tightness.  *New or worsening cough, shortness of breath, or wheeze.  *If you are vomiting and cannot keep down fluids or your medications.  *You are getting dehydrated due to continued vomiting, diarrhea, or other reasons. Signs of dehydration include dry mouth, rapid heartbeat, or feeling dizzy or faint when standing.  *You see blood or dark/black material when you vomit or have a bowel movement.  *You experience burning when you urinate, have blood in your urine, or experience a discharge.  *Your pain is not improving within 8-12 hours or is not gone within 24 hours. Call or return immediately if your pain is getting worse, changes location, or moves to your chest or back.  *You have shaking chills, or fever greater than 101.5 degrees Fahrenheit or 38 degrees Celsius.  *Any change in your symptoms, or any new symptoms that concern you.  If travel by plane or long term driving, please stand and walk frequently

## 2023-01-09 NOTE — PROGRESS NOTE ADULT - ASSESSMENT
52F BRCA positive with PMHX of HLD, HTN, pre-diabetes, hypothyroidism and PSHx of risk-reducing BSO (2022, Dr. Foster at [facility]) presents with 1-day history of upper back pain associated with nausea and 1 episode of NBNB emesis. CTAP shows stone at gallbladder neck with mild distension of the gallbladder without wall thickening or PCF, CBD 0.8cm, no intrahepatic biliary dilatation. Symptomatic cholelithiasis. s/p OR for laparoscopic cholecystectomy with IOC.     abx 24h postop  CLD  Pain and nausea control PRN  Home meds as appropriate  HSQ/SCDs/OOBA/IS  AM labs  dc home today

## 2023-01-09 NOTE — DISCHARGE NOTE PROVIDER - NSDCCPCAREPLAN_GEN_ALL_CORE_FT
PRINCIPAL DISCHARGE DIAGNOSIS  Diagnosis: Acute cholecystitis  Assessment and Plan of Treatment: s/p laparoscopic cholecystectomy       PRINCIPAL DISCHARGE DIAGNOSIS  Diagnosis: Acute cholecystitis  Assessment and Plan of Treatment: s/p laparoscopic cholecystectomy      SECONDARY DISCHARGE DIAGNOSES  Diagnosis: HTN (hypertension)  Assessment and Plan of Treatment: You have high blood pressure. Please continue with home medications. losartan 100mg daily and amlodipine 5mg. It is important to continue to take your medications on time,  monitor your blood pressure at home, keep a log of your home readings and follow up with your Primary Care Physician. As per AHA/ACC guidelines, it is important to adhere to a DASH Diet of fresh fruits, vegetables, lean meats such as poultry and fish, and whole wheat carbs. 30 minutes of aerobic exercise per day 3-4 times a week, reducing salt intake <1.5g/day, and cutting down on highly processed foods are also shown to reduce BP. Uncontrolled BP may result in organ damage to your eyes, brain, heart, and kidneys by causing strokes, heart attacks, kidney failure, and bleeds in your eye.    Diagnosis: Hypothyroidism  Assessment and Plan of Treatment: Please continue with levothyroxine 75mcg (Mon-Fri) and 50mcg (Sat, Sun) and follow up with your primary care doctor.

## 2023-01-09 NOTE — DISCHARGE NOTE PROVIDER - CARE PROVIDER_API CALL
Eren May)  Surgery  [address] [place] [address], [address]  [place] [address]  Phone: [phone]  Fax: [phone]  Follow Up Time: 1 week

## 2023-01-09 NOTE — DISCHARGE NOTE PROVIDER - NSDCMRMEDTOKEN_GEN_ALL_CORE_FT
acetaminophen 500 mg oral tablet: 2 tab(s) orally every 6 hours, As Needed -for mild pain MDD:4000mg   amLODIPine 5 mg oral tablet: 1 tab(s) orally once a day  Colace 100 mg oral capsule: 1 cap(s) orally once a day MDD:1  levothyroxine 50 mcg (0.05 mg) oral tablet: orally once a day  x2 weekly   levothyroxine 75 mcg (0.075 mg) oral tablet: 1 tab(s) orally once a day  x5 weekly   losartan 100 mg oral tablet: 1 tab(s) orally once a day  oxyCODONE 5 mg oral tablet: 1 tab(s) orally once, As needed, Moderate Pain (4 - 6) MDD:4   acetaminophen 500 mg oral tablet: 2 tab(s) orally every 6 hours, As Needed -for mild pain MDD:4000mg   amLODIPine 5 mg oral tablet: 1 tab(s) orally once a day  amoxicillin-clavulanate 875 mg-125 mg oral tablet: 1 tab(s) orally 2 times a day MDD:2  Colace 100 mg oral capsule: 1 cap(s) orally once a day MDD:1  levothyroxine 50 mcg (0.05 mg) oral tablet: orally once a day  x2 weekly   levothyroxine 75 mcg (0.075 mg) oral tablet: 1 tab(s) orally once a day  x5 weekly   losartan 100 mg oral tablet: 1 tab(s) orally once a day  oxyCODONE 5 mg oral tablet: 1 tab(s) orally every 6 hours, As Needed -Moderate Pain (4 - 6) MDD:4

## 2023-01-09 NOTE — DISCHARGE NOTE NURSING/CASE MANAGEMENT/SOCIAL WORK - PATIENT PORTAL LINK FT
You can access the FollowMyHealth Patient Portal offered by [facility] by registering at the following website: [email or web address]. By joining [employer]’s FollowMyHealth portal, you will also be able to view your health information using other applications (apps) compatible with our system.

## 2023-01-09 NOTE — DISCHARGE NOTE NURSING/CASE MANAGEMENT/SOCIAL WORK - NSDCPEFALRISK_GEN_ALL_CORE
For information on Fall & Injury Prevention, visit: [email or web address] OR  [email or web address] OR  https://www.cdc.gov/steadi/patient.html

## 2023-01-09 NOTE — PROGRESS NOTE ADULT - ASSESSMENT
52F BRCA positive with PMHX of HLD, HTN, pre-diabetes, hypothyroidism and PSHx of risk-reducing BSO (2022, Dr. Foster at [facility]) presents with 1-day history of upper back pain associated with nausea and 1 episode of NBNB emesis. CTAP shows stone at gallbladder neck with mild distension of the gallbladder without wall thickening or PCF, CBD 0.8cm, no intrahepatic biliary dilatation. Symptomatic cholelithiasis. Will be going to the OR for laparoscopic cholecystectomy with IOC.     #Symptomatic cholelithiasis  - s/p laparoscopic cholecystectomy with IOC 1/8.  Pain control and management per surgery. Plan for DC today per surgery team.    #HTN  - On losartan 100mg daily and amlodipine 5mg daily at home. losartan was held for now to prevent perioperative hypotension. can resume upon DC.    #hypothyroidism  - On levothyroxine 75mcg (Mon - Fri), 50mcg (on Sat, Sun). (Dose adjusted by her PCP to maintain thyroid function stable)  - Continue home dosage.     #HLD  - Not on meds. DASH diet    #Pre-diabetes  - Pt was diagnosed previously. Low carb diet. Outpatient follow up.    Medically stable for DC. Upon DC, continue her home meds  losartan 100mg daily and amlodipine 5mg daily, levothyroxine 75mcg (Mon - Fri), 50mcg (on Sat, Sun) and follow up with PCP regularly.

## 2023-01-09 NOTE — PROGRESS NOTE ADULT - SUBJECTIVE AND OBJECTIVE BOX
Patient is a 52y old  Female who presents with a chief complaint of biliary colic (09 Jan 2023 08:15)      INTERVAL HPI/OVERNIGHT EVENTS:  Pt states she is feeling well. she has mild pain on surgical scars. She asked when she can shower and how to take care of wounds, message was passed to surgical team. Pt asked what should eat for "low fat diet". I educated for low fat diet and also provided printed DASH and low fat diet information.       REVIEW OF SYSTEMS:  CONSTITUTIONAL: No fever, weight loss, or fatigue  EYES: No eye pain, visual disturbances, or discharge  ENMT:  No difficulty hearing, tinnitus, vertigo; No sinus or throat pain  NECK: No pain or stiffness  RESPIRATORY: No cough, wheezing, chills or hemoptysis; No shortness of breath  CARDIOVASCULAR: No chest pain, palpitations, dizziness, or leg swelling  GASTROINTESTINAL: no abdominal or epigastric pain. No nausea, vomiting, or hematemesis; No diarrhea or constipation. No melena or hematochezia.  GENITOURINARY: No dysuria, frequency, hematuria, or incontinence  NEUROLOGICAL: No headaches, memory loss, loss of strength, numbness, or tremors  SKIN: No itching, burning, rashes, +surgical scars  LYMPH NODES: No enlarged glands  ENDOCRINE: No heat or cold intolerance; No hair loss  MUSCULOSKELETAL: No joint pain or swelling; No muscle, back, or extremity pain  PSYCHIATRIC: No depression, anxiety, mood swings, or difficulty sleeping  HEME/LYMPH: No easy bruising, or bleeding gums  ALLERY AND IMMUNOLOGIC: No hives or eczema      PHYSICAL EXAM:  GENERAL: NAD, well-groomed, well-developed  HEAD:  Atraumatic, Normocephalic  EYES: EOMI, PERRLA, conjunctiva and sclera clear  ENMT: No tonsillar erythema, exudates, or enlargement; Moist mucous membranes  NECK: Supple, No JVD, Normal thyroid  NERVOUS SYSTEM:  Alert & Oriented X3, Good concentration; Moving all extremities   CHEST/LUNG:  No rales, rhonchi, wheezing, or rubs  HEART: Regular rate and rhythm; No murmurs, rubs, or gallops  ABDOMEN: Soft, Nontender, Nondistended; Bowel sounds present +surgical scars  EXTREMITIES:  2+ Peripheral Pulses, No clubbing, cyanosis, or edema  LYMPH: No lymphadenopathy noted  SKIN: No rashes +surgical scars        Vital Signs Last 24 Hrs  T(C): 37.3 (09 Jan 2023 08:23), Max: 37.4 (09 Jan 2023 05:00)  T(F): 99.1 (09 Jan 2023 08:23), Max: 99.4 (09 Jan 2023 05:00)  HR: 70 (09 Jan 2023 08:23) (69 - 91)  BP: 126/72 (09 Jan 2023 08:23) (123/75 - 162/72)  BP(mean): 91 (09 Jan 2023 05:00) (91 - 108)  RR: 17 (09 Jan 2023 08:23) (14 - 17)  SpO2: 96% (09 Jan 2023 08:23) (93% - 100%)    Parameters below as of 09 Jan 2023 08:23  Patient On (Oxygen Delivery Method): room air        01-08-23 @ 07:01  -  01-09-23 @ 07:00  --------------------------------------------------------  IN: 2340 mL / OUT: 1275 mL / NET: 1065 mL    01-09-23 @ 07:01  -  01-09-23 @ 12:36  --------------------------------------------------------  IN: 0 mL / OUT: 1300 mL / NET: -1300 mL        PHYSICAL EXAM:  GENERAL: NAD, well-groomed, well-developed  HEAD:  Atraumatic, Normocephalic  EYES: EOMI, PERRLA, conjunctiva and sclera clear  ENMT: No tonsillar erythema, exudates, or enlargement; Moist mucous membranes, Good dentition, No lesions  NECK: Supple, No JVD, Normal thyroid  NERVOUS SYSTEM:  Alert & Oriented X3, Good concentration; Motor Strength 5/5 B/L upper and lower extremities; DTRs 2+ intact and symmetric  CHEST/LUNG: Clear to percussion bilaterally; No rales, rhonchi, wheezing, or rubs  HEART: Regular rate and rhythm; No murmurs, rubs, or gallops  ABDOMEN: Soft, Nontender, Nondistended; Bowel sounds present  EXTREMITIES:  2+ Peripheral Pulses, No clubbing, cyanosis, or edema  LYMPH: No lymphadenopathy noted  SKIN: No rashes or lesions    Consultant(s) Notes Reviewed:  [x ] YES  [ ] NO  Care Discussed with Consultants/Other Providers [ x] YES  [ ] NO    LABS:      Culture - Body Fluid with Gram Stain (collected 01-08-23 @ 20:32)  Source: .Body Fluid Gallbladder Fluid  Gram Stain (01-09-23 @ 05:12):    Rare White blood cells    No organisms seen  Preliminary Report (01-09-23 @ 08:57):    No growth to date        RADIOLOGY & ADDITIONAL TESTS:    Imaging Personally Reviewed:  [ ] YES  [ ] NO  amLODIPine   Tablet 5 milliGRAM(s) Oral every 24 hours  heparin   Injectable 5000 Unit(s) SubCutaneous every 8 hours  HYDROmorphone  Injectable 0.5 milliGRAM(s) IV Push once PRN  levothyroxine 75 MICROGram(s) Oral <User Schedule>  magnesium sulfate  IVPB 1 Gram(s) IV Intermittent once  metroNIDAZOLE  IVPB 500 milliGRAM(s) IV Intermittent every 8 hours  ondansetron Injectable 4 milliGRAM(s) IV Push once PRN  oxyCODONE    IR 5 milliGRAM(s) Oral once PRN  potassium chloride   Powder 40 milliEquivalent(s) Oral once      HEALTH ISSUES - PROBLEM Dx:        
INTERVAL HPI/OVERNIGHT EVENTS: POC wnl, minimal PO intake. Passed ToV.    STATUS POST:  laparoscopic cholecystectomy    POST OPERATIVE DAY #: 1    SUBJECTIVE:  pt seen at bedside, pain well controlled. denies nausea/vomiting. tolerating diet    MEDICATIONS  (STANDING):  amLODIPine   Tablet 5 milliGRAM(s) Oral every 24 hours  heparin   Injectable 5000 Unit(s) SubCutaneous every 8 hours  lactated ringers. 1000 milliLiter(s) (100 mL/Hr) IV Continuous <Continuous>  levothyroxine 75 MICROGram(s) Oral <User Schedule>  metroNIDAZOLE  IVPB 500 milliGRAM(s) IV Intermittent every 8 hours    MEDICATIONS  (PRN):  HYDROmorphone  Injectable 0.5 milliGRAM(s) IV Push once PRN Severe Pain (7 -10)  ondansetron Injectable 4 milliGRAM(s) IV Push once PRN Nausea and/or Vomiting  oxyCODONE    IR 5 milliGRAM(s) Oral once PRN Moderate Pain (4 - 6)      Vital Signs Last 24 Hrs  T(C): 37.4 (2023 05:00), Max: 37.4 (2023 05:00)  T(F): 99.4 (2023 05:00), Max: 99.4 (2023 05:00)  HR: 69 (2023 05:00) (69 - 91)  BP: 123/75 (2023 05:00) (123/75 - 162/72)  BP(mean): 91 (2023 05:00) (91 - 108)  RR: 17 (2023 05:00) (14 - 18)  SpO2: 95% (2023 05:00) (93% - 100%)    Parameters below as of 2023 05:00  Patient On (Oxygen Delivery Method): room air        PHYSICAL EXAM:      Constitutional: A&Ox3    Respiratory: non labored breathing, no respiratory distress    Cardiovascular: NSR, RRR    Gastrointestinal: soft, nondistended, appropriate incisional tenderness, incisions c/d/i    Extremities: (-) edema                  I&O's Detail    2023 07:01  -  2023 07:00  --------------------------------------------------------  IN:    IV PiggyBack: 200 mL    Lactated Ringers: 840 mL    Lactated Ringers: 1300 mL  Total IN: 2340 mL    OUT:    Oral Fluid: 0 mL    Voided (mL): 1275 mL  Total OUT: 1275 mL    Total NET: 1065 mL          LABS:                        13.0   5.25  )-----------( 233      ( 2023 05:30 )             38.6         139  |  102  |  13  ----------------------------<  122<H>  3.7   |  27  |  0.64    Ca    8.6      2023 05:30  Phos  4.4       Mg     1.9         TPro  6.3  /  Alb  4.0  /  TBili  0.7  /  DBili  x   /  AST  16  /  ALT  31  /  AlkPhos  80        Urinalysis Basic - ( 2023 10:46 )    Color: Yellow / Appearance: Clear / S.020 / pH: x  Gluc: x / Ketone: NEGATIVE  / Bili: NEGATIVE / Urobili: 0.2 E.U./dL   Blood: x / Protein: NEGATIVE mg/dL / Nitrite: NEGATIVE   Leuk Esterase: NEGATIVE / RBC: < 5 /HPF / WBC < 5 /HPF   Sq Epi: x / Non Sq Epi: 0-5 /HPF / Bacteria: Present /HPF        RADIOLOGY & ADDITIONAL STUDIES:
STATUS POST:  Laparoscopic cholecystectomy        POST OPERATIVE DAY #: 0    SUBJECTIVE:   Patient seen and examined for postop check. No new complaints. -n-v-cp-sob.    Vital Signs Last 24 Hrs  T(C): 36.7 (2023 19:59), Max: 37.3 (2023 18:12)  T(F): 98 (2023 19:59), Max: 99.1 (2023 18:12)  HR: 74 (2023 19:59) (70 - 87)  BP: 134/82 (2023 19:59) (117/72 - 162/72)  BP(mean): 99 (2023 19:59) (96 - 108)  RR: 17 (2023 19:59) (14 - 18)  SpO2: 94% (2023 19:59) (94% - 100%)    Parameters below as of 2023 19:59  Patient On (Oxygen Delivery Method): room air        I&O's Summary    2023 07:01  -  2023 07:00  --------------------------------------------------------  IN: 420 mL / OUT: 550 mL / NET: -130 mL    2023 07:01  -  2023 20:38  --------------------------------------------------------  IN: 1040 mL / OUT: 300 mL / NET: 740 mL        Physical Exam:  General Appearance: Appears well, NAD  Pulmonary: Nonlabored breathing, no respiratory distress  Cardiovascular: NSR  Abdomen: Soft, nondistended, nontender.  Extremities: WWP, SCD's in place     LABS:                        13.1   5.12  )-----------( 228      ( 2023 06:45 )             40.3         143  |  105  |  14  ----------------------------<  101<H>  3.8   |  27  |  0.75    Ca    8.7      2023 06:45  Phos  3.8     -  Mg     2.1         TPro  6.3  /  Alb  4.0  /  TBili  0.7  /  DBili  x   /  AST  16  /  ALT  31  /  AlkPhos  80        Urinalysis Basic - ( 2023 10:46 )    Color: Yellow / Appearance: Clear / S.020 / pH: x  Gluc: x / Ketone: NEGATIVE  / Bili: NEGATIVE / Urobili: 0.2 E.U./dL   Blood: x / Protein: NEGATIVE mg/dL / Nitrite: NEGATIVE   Leuk Esterase: NEGATIVE / RBC: < 5 /HPF / WBC < 5 /HPF   Sq Epi: x / Non Sq Epi: 0-5 /HPF / Bacteria: Present /HPF      
SUBJECTIVE:  Patient seen and examined on AM rounds with chief resident. No acute events overnight. This morning, patient is feeling well. She reports mild RUQ pain that is controlled with pain medications. She denies nausea, vomiting, fever, and chills. Patient is currently NPO for the OR today.     MEDICATIONS  (STANDING):  amLODIPine   Tablet 5 milliGRAM(s) Oral every 24 hours  heparin   Injectable 5000 Unit(s) SubCutaneous every 8 hours  lactated ringers. 1000 milliLiter(s) (105 mL/Hr) IV Continuous <Continuous>  levothyroxine Injectable 60 MICROGram(s) IV Push at bedtime    MEDICATIONS  (PRN):      Vital Signs Last 24 Hrs  T(C): 36.7 (2023 08:32), Max: 37.1 (2023 20:50)  T(F): 98.1 (2023 08:32), Max: 98.7 (2023 20:50)  HR: 76 (2023 08:32) (72 - 81)  BP: 133/72 (2023 08:32) (117/72 - 162/82)  BP(mean): --  RR: 17 (2023 08:32) (16 - 18)  SpO2: 96% (2023 08:32) (95% - 100%)    Parameters below as of 2023 08:32  Patient On (Oxygen Delivery Method): room air    Physical Exam:  General: NAD, resting comfortably in bed  Pulmonary: Nonlabored breathing, no respiratory distress  Cardiovascular: NSR  Abdominal: soft, mild RUQ tenderness, no rebound or guarding, non-distended.  Extremities: WWP, normal strength    I&O's Summary    2023 07:01  -  2023 07:00  --------------------------------------------------------  IN: 420 mL / OUT: 550 mL / NET: -130 mL    2023 07:01  -  2023 08:46  --------------------------------------------------------  IN: 0 mL / OUT: 0 mL / NET: 0 mL        LABS:                        13.1   5.12  )-----------( 228      ( 2023 06:45 )             40.3     -08    143  |  105  |  14  ----------------------------<  101<H>  3.8   |  27  |  0.75    Ca    8.7      2023 06:45  Phos  3.8       Mg     2.1         TPro  6.3  /  Alb  4.0  /  TBili  0.7  /  DBili  x   /  AST  16  /  ALT  31  /  AlkPhos  80        Urinalysis Basic - ( 2023 10:46 )    Color: Yellow / Appearance: Clear / S.020 / pH: x  Gluc: x / Ketone: NEGATIVE  / Bili: NEGATIVE / Urobili: 0.2 E.U./dL   Blood: x / Protein: NEGATIVE mg/dL / Nitrite: NEGATIVE   Leuk Esterase: NEGATIVE / RBC: < 5 /HPF / WBC < 5 /HPF   Sq Epi: x / Non Sq Epi: 0-5 /HPF / Bacteria: Present /HPF      CAPILLARY BLOOD GLUCOSE        LIVER FUNCTIONS - ( 2023 06:45 )  Alb: 4.0 g/dL / Pro: 6.3 g/dL / ALK PHOS: 80 U/L / ALT: 31 U/L / AST: 16 U/L / GGT: x

## 2023-01-13 DIAGNOSIS — Z15.01 GENETIC SUSCEPTIBILITY TO MALIGNANT NEOPLASM OF BREAST: ICD-10-CM

## 2023-01-13 DIAGNOSIS — R06.83 SNORING: ICD-10-CM

## 2023-01-13 DIAGNOSIS — K80.41 CALCULUS OF BILE DUCT WITH CHOLECYSTITIS, UNSPECIFIED, WITH OBSTRUCTION: ICD-10-CM

## 2023-01-13 DIAGNOSIS — E03.9 HYPOTHYROIDISM, UNSPECIFIED: ICD-10-CM

## 2023-01-13 DIAGNOSIS — I10 ESSENTIAL (PRIMARY) HYPERTENSION: ICD-10-CM

## 2023-01-13 DIAGNOSIS — E78.5 HYPERLIPIDEMIA, UNSPECIFIED: ICD-10-CM

## 2023-01-13 DIAGNOSIS — R73.03 PREDIABETES: ICD-10-CM

## 2023-01-13 LAB
CULTURE RESULTS: NO GROWTH — SIGNIFICANT CHANGE UP
SPECIMEN SOURCE: SIGNIFICANT CHANGE UP
SURGICAL PATHOLOGY STUDY: SIGNIFICANT CHANGE UP

## (undated) DEVICE — TROCAR COVIDIEN VERSAONE FIXATION CANNULA 5MM

## (undated) DEVICE — INSUFFLATION NDL COVIDIEN SURGINEEDLE VERESS 120MM

## (undated) DEVICE — SOL IRR BAG NS 0.9% 3000ML

## (undated) DEVICE — PACK GYN WDC

## (undated) DEVICE — TUBING STRYKER PNEUMOSURE HI FLOW INSUFFLATOR

## (undated) DEVICE — WARMING BLANKET UPPER ADULT

## (undated) DEVICE — Device

## (undated) DEVICE — D HELP - CLEARVIEW CLEARIFY SYSTEM

## (undated) DEVICE — PACK GENERAL LAPAROSCOPY

## (undated) DEVICE — VENODYNE/SCD SLEEVE CALF MEDIUM

## (undated) DEVICE — SUT VICRYL 0 27" UR-6

## (undated) DEVICE — TIP METZENBAUM SCISSOR MONOPOLAR ENDOCUT (ORANGE)

## (undated) DEVICE — GLV 7 PROTEXIS (WHITE)

## (undated) DEVICE — TROCAR COVIDIEN VERSAPORT BLADELESS OPTICAL 5MM STANDARD

## (undated) DEVICE — GLV 8 PROTEXIS (WHITE)

## (undated) DEVICE — POSITIONER FOAM EGG CRATE ULNAR 2PCS (PINK)

## (undated) DEVICE — TROCAR COVIDIEN VERSAONE BLUNT TIP HASSAN 12MM

## (undated) DEVICE — POSITIONER PINK PAD PIGAZZI SYSTEM XL W ARM PROTECTOR

## (undated) DEVICE — ENDOCATCH 10MM SPECIMEN POUCH

## (undated) DEVICE — TROCAR COVIDIEN VERSAPORT BLADELESS OPTICAL 12MM STANDARD

## (undated) DEVICE — FOLEY TRAY 16FR 5CC LF UMETER CLOSED

## (undated) DEVICE — TROCAR ETHICON ENDOPATH XCEL BLADELESS 5MM X 100MM STABILITY

## (undated) DEVICE — DRSG DERMABOND 0.7ML

## (undated) DEVICE — ELCTR CORD FOOTSWITCH 1PLR LAPSCP 10FT

## (undated) DEVICE — SUT MONOCRYL 4-0 18" PS-2

## (undated) DEVICE — MARKING PEN W RULER

## (undated) DEVICE — SUT MONOCRYL 4-0 27" PS-2 UNDYED